# Patient Record
Sex: FEMALE | Race: WHITE | Employment: OTHER | URBAN - METROPOLITAN AREA
[De-identification: names, ages, dates, MRNs, and addresses within clinical notes are randomized per-mention and may not be internally consistent; named-entity substitution may affect disease eponyms.]

---

## 2017-01-06 ENCOUNTER — HOSPITAL ENCOUNTER (OUTPATIENT)
Dept: PHYSICAL THERAPY | Age: 67
Discharge: HOME | End: 2017-01-06
Payer: MEDICARE

## 2017-01-06 PROCEDURE — 97162 PT EVAL MOD COMPLEX 30 MIN: CPT

## 2017-01-06 PROCEDURE — G8979 MOBILITY GOAL STATUS: HCPCS

## 2017-01-06 PROCEDURE — G8978 MOBILITY CURRENT STATUS: HCPCS

## 2017-01-06 NOTE — PROGRESS NOTES
Higinio Alexander  : 1950 [facility]  [address]  [phone]   [phone]       OUTPATIENT PHYSICAL THERAPY:Initial Assessment 2017    ICD-10: Treatment Diagnosis: Difficulty in walking, not elsewhere classified (R26.2)  Pain in right hip (M25.551)   Precautions/Allergies:   Review of patient's allergies indicates no known allergies. Fall Risk Score: 0 (? 5 = High Risk)  MD Orders: Evaluate and treat; hip abductor/quad strengthening MEDICAL/REFERRING DIAGNOSIS:  right hip   DATE OF ONSET: 6-8 months ago  REFERRING PHYSICIAN: Maryann Gruber MD  RETURN PHYSICIAN APPOINTMENT: 2017     INITIAL ASSESSMENT:  Higinio Alexander is a 73 y.o. female who presents to physical therapy for R groin/hip pain beginning early to [facility]. This session, she presents with decreased R hip strength/endurance, pain in R hip region with end range R hip ER/flexion and with impingement tests, decreased dynamic standing balance, multiple postural and gait deficits, and decreased functional mobility as evident by a score of 33/80 on the Lower Extremity Functional Scale (with lower scores indicating increased disability). Her symptoms appear to be consistent with an impingement in her R hip (potentially due to labral tear and/or osteoarthritis, but will need further evaluation to rule in/out). Pt may benefit from skilled PT to address the above listed deficits to improve ability to perform pain-free standing/ambulation/ADLs and to improve overall quality of life prior to discharge. PROBLEM LIST (Impacting functional limitations):  1. Decreased Strength  2. Decreased ADL/Functional Activities  3. Decreased Transfer Abilities  4. Decreased Ambulation Ability/Technique  5. Decreased Balance  6. Increased Pain  7. Decreased Activity Tolerance  8. Decreased Flexibility/Joint Mobility INTERVENTIONS PLANNED:  1. Balance Exercise  2.  Bed Mobility  3. Cold  4. Family Education  5. Gait Training  6. Heat  7. Home Exercise Program (HEP)  8. Manual Therapy  9. Neuromuscular Re-education/Strengthening  10. Range of Motion (ROM)  11. Therapeutic Activites  12. Therapeutic Exercise/Strengthening  13. Transcutaneous Electrical Nerve Stimulation (TENS)  14. Transfer Training  15. Ultrasound (US)   TREATMENT PLAN:  Effective Dates: 1/6/2017 TO 3/3/2017. Frequency/Duration: 2 times a week for 8 weeks  GOALS: (Goals have been discussed and agreed upon with patient.)  Short-Term Functional Goals: Time Frame: 4 weeks  1. Pt will be compliant with HEP in order to increase LE strength/endurance/mobility to improve functional mobility and overall quality of life. 2. Pt will improve score on the Lower Extremity Functional Scale to 42/80 in order to demonstrate improved functional mobility and quality of life. 3. Pt will report standing for > 30 minutes with minimal to no increase in pain in order to be able to stand for prolonged periods as needed to perform household chores. 4. Pt will be able to ascend/descend 6 steps Flo with or without rail with reciprocal gait pattern in order to improve community mobility. Discharge Goals: Time Frame: 8 weeks  1. Pt will be independent with HEP in order to increase LE strength/endurance/mobility to improve functional mobility and overall quality of life. 2. Pt will improve score on the Lower Extremity Functional Scale to 50/80 in order to demonstrate improved functional mobility and quality of life. 3. Pt will report standing for > 60 minutes with minimal to no increase in pain in order to be able to stand for prolonged periods as needed to perform household chores. 4. Pt will be able to ascend/descend 14 steps Flo with or without rail with reciprocal gait pattern in order to improve community mobility.   Rehabilitation Potential For Stated Goals: Good  Regarding Teresa Murrell's therapy, I certify that the treatment plan above will be carried out by a therapist or under their direction. Thank you for this referral,  Erasmo Cagle DPT     Referring Physician Signature: Gali Torres MD              Date                    HISTORY:   History of Present Injury/Illness (Reason for Referral):  Pt states in 2013 she went to Dr. Thomas Hu for pain in R groin/hip adductor region (noticed after standing for majority of day). He told her it was most likely a labral tear per her report. States she did physical therapy for about 10 weeks, and then did exercises at home by herself. She was doing well with exercises and Mobic, but she had to be taken off Mobic 6-8 months ago to avoid kidney damage. After quitting use of Mobic, her R hip started aggravating her again (in same region). She tried taking Ultram, but it was only masking pain according to MD. She went back to [facility], and he put her on Relafin (anti-inflammatory). States she has been keeping up with her exercises (has been doing supine IR, supine stretching with legs straight, and butterfly stretch) but has not been doing them quite as often unless if her R hip started hurting. Pain at worst is 6-7/10 dull aching pain in her R anterior/medial R hip, and spreads down into her posterior leg to her ankle as a muscle pulling sensation, and into her R posterior iliac region (aggravating activities include prolonged standing/kneeling/ambulation > 30 minutes, ascending steps/hills, turning in bed at night to either side). Pain at best is 0/10 (eases pain with rest, pain medication, hot shower). Pt states she is not able to get up from the floor as well (has to have something to pull up on, otherwise her R hip will hurt and feel like it will not support her), and she is having difficulty going up steep hills [place] [facility]. Pain increases throughout the day.   Past Medical History/Comorbidities:   Ms. Rui Bella  has a past medical history of Allergic rhinitis; Chest pain; Fracture; GERD (gastroesophageal reflux disease); History of shingles; Hyperlipidemia; Hypertension; Hypothyroidism; IBS (irritable bowel syndrome); Internal hemorrhoids without mention of complication (0343); Kidney stone; Osteoarthritis of both hips; Palpitations; Rectocele (2014); and Urinary incontinence. Ms. Phong Arevalo  has a past surgical history that includes total abdominal hysterectomy (Left, 1988); oophorectomy (Right, 1993); open reduction internal fixation (Left, 2006); septoplasty; colonoscopy (4/14/14); ovarian cyst removal (1988); heent (1973); other surgical (Right, 1993); orthopaedic (Right, 2001); ankle fracture tx (Left, 2006); and bladder repair. 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 Indications: Restless Legs Syndrome, Disp: 270 Cap, Rfl: 3    traMADol (ULTRAM) 50 mg tablet, Take 1-2 tablets twice daily as needed for hip pain, Disp: 60 Tab, Rfl: 0    pravastatin (PRAVACHOL) 40 mg tablet, Take 1 Tab by mouth nightly., Disp: 90 Tab, Rfl: 1    buPROPion XL (WELLBUTRIN XL) 150 mg tablet, Take 1 Tab by mouth every morning., Disp: 30 Tab, Rfl: 11    naproxen sodium 220 mg cap, Take  by mouth as needed. , Disp: , Rfl:     levothyroxine (SYNTHROID) 100 mcg tablet, Take 1 Tab by mouth Daily (before breakfast). , Disp: 90 Tab, Rfl: 3    lisinopril (PRINIVIL, ZESTRIL) 20 mg tablet, Take 1 Tab by mouth daily. (Patient taking differently: Take 20 mg by mouth every morning.), Disp: 90 Tab, Rfl: 3    pantoprazole (PROTONIX) 40 mg tablet, Take 1 Tab by mouth daily. (Patient taking differently: Take 40 mg by mouth nightly.), Disp: 90 Tab, Rfl: 3    cholecalciferol, vitamin D3, (VITAMIN D3) 2,000 unit tab, Take 1 Tab by mouth every morning., Disp: , Rfl:     coenzyme q10 (CO Q-10) 10 mg cap, Take 1 Cap by mouth daily. (Patient taking differently: Take 1 Cap by mouth every morning.), Disp: 90 Cap, Rfl: 3    Cetirizine (ZYRTEC) 10 mg cap, Take 5 mg by mouth as needed. Indications: ALLERGIC RHINITIS, Disp: , Rfl:     senna-docusate (PERICOLACE) 8.6-50 mg per tablet, Take 1 Tab by mouth every other day.  Indications: CONSTIPATION, Disp: , Rfl:     FIBER, CALCIUM POLYCARBOPHIL, PO, Take  by mouth every morning., Disp: , Rfl:    Relafin (500 mg, 2 times per day)  Date Last Reviewed:  1/6/2017   # of Personal Factors/Comorbidities that affect the Plan of Care: 3+: HIGH COMPLEXITY   EXAMINATION:   Observation/Orthostatic Postural Assessment:          Slight L pelvic drop, slight forward trunk in standing; kyphotic posture in sitting with forward head/rounded shoulders and posterior pelvic tilt  Palpation:          Tenderness noted in R PSIS region and R gluteal muscles  ROM:    Initial measurement: (on 1/6/2017) Initial measurement: (on 1/6/2017) Reassessment measurement:  Reassessment measurement:    L LE:  L hip motion WFL but painful in R groin/hip area with end range hip ER R LE:  R hip motion WFL but painful in R anterior hip with end range hip ER, SLR, adduction       Strength:    Initial measurement: (on 1/6/2017) Initial measurement: (on 1/6/2017) Reassessment measurement:  Reassessment measurement:    L LE:  Hip flexion: 5/5  Hip extension: 4+/5  Hip abduction: 5/5  Hip adduction: 4+/5  Hip IR: 5/5  Hip ER: 4+/5  Knee flexion: 4/5  Knee extension: 5/5  Ankle DF: 4+/5  Ankle PF: 5/5 R LE:  Hip flexion: 4-/5*  Hip extension: 4/5  Hip abduction: 4/5*  Hip adduction: 4+/5  Hip IR: 4+/5  Hip ER: 4/5*  Knee flexion: 4-/5  Knee extension: 4+/5 (pain in posterior R hip)  Ankle DF: 4+/5  Ankle PF: 5/5  *pain around R hip        Special Tests:          Scours: + on R for impingement        R anterior hip impingement: + on R        Functional leg length discrepancy: unclear, need to assess further next session        Sacral PA mobilizations: + for pain at R side of sacrum        Juan Francisco test stretch: + for pulling pain at R anterior hip  Neurological Screen:        Myotomes:  WFL        Dermatomes:  No deficits noted  Functional Mobility:         Gait/Ambulation:  Shortened L/R step length, decreased B step clearance, increased lateral sway        Transfers:  Pt had increased difficulty standing up with increased time taken  Balance:         Minimal to moderate lateral sway during ambulation  Sensation:         No deficits noted   Body Structures Involved:  1. Nerves  2. Bones  3. Joints  4. Muscles  5. Ligaments Body Functions Affected:  1. Sensory/Pain  2. Neuromusculoskeletal  3. Movement Related Activities and Participation Affected:  1. General Tasks and Demands  2. Mobility  3. Domestic Life  4. Interpersonal Interactions and Relationships  5. Community, Social and [place]   # of elements that affect the Plan of Care: 4+: HIGH COMPLEXITY   CLINICAL PRESENTATION:   Presentation: Evolving clinical presentation with changing clinical characteristics: MODERATE COMPLEXITY   CLINICAL DECISION MAKING:   Outcome Measure: Tool Used: Lower Extremity Functional Scale (LEFS)  Score:  Initial: 33/80 Most Recent: X/80 (Date: -- )   Interpretation of Score: 20 questions each scored on a 5 point scale with 0 representing \"extreme difficulty or unable to perform\" and 4 representing \"no difficulty\".   The lower the score, the greater the functional disability. 80/80 represents no disability. Minimal detectable change is 9 points. Score 80 79-63 62-48 47-32 31-16 15-1 0   Modifier CH CI CJ CK CL CM CN     ? Mobility - Walking and Moving Around:     - CURRENT STATUS: CK - 40%-59% impaired, limited or restricted    - GOAL STATUS: CJ - 20%-39% impaired, limited or restricted    - D/C STATUS:  ---------------To be determined---------------    [place] / [place] / Product Type: Medicare /   Medical Necessity:   · Patient is expected to demonstrate progress in strength, range of motion, balance and functional technique to improve ability to perform pain-free standing/ambulation/kneeling and to improve overall quality of life. · Patient demonstrates good rehab potential due to higher previous functional level. Reason for Services/Other Comments:  · Patient continues to require modification of therapeutic interventions to increase complexity of exercises. Use of outcome tool(s) and clinical judgement create a POC that gives a: Questionable prediction of patient's progress: MODERATE COMPLEXITY   TREATMENT:   (In addition to Assessment/Re-Assessment sessions the following treatments were rendered)  Subjective comments at beginning of session: 2-3/10 pain   Assessment only today, no treatment provided. Treatment/Session Assessment:  See assessment above. · Pain/ Symptoms: Initial:   2-3/10 pain Post Session: 0/10 pain ·   Compliance with Program/Exercises: Will assess as treatment progresses. · Recommendations/Intent for next treatment session: \"Next visit will focus on advancements to more challenging activities and reduction in assistance provided\".  Work on R hip stretching, strengthening; assess for leg length discrepancy/pelvic instability  Total Treatment Duration:  PT Patient Time In/Time Out  Time In: 0937  Time Out: Lilly 27, BARBARA

## 2017-01-06 NOTE — PROGRESS NOTES
Ambulatory/Rehab Services H2 Model Falls Risk Assessment    Risk Factor Pts. ·   Confusion/Disorientation/Impulsivity  []    4 ·   Symptomatic Depression  []   2 ·   Altered Elimination  []   1 ·   Dizziness/Vertigo  []   1 ·   Gender (Male)  []   1 ·   Any administered antiepileptics (anticonvulsants):  []   2 ·   Any administered benzodiazepines:  []   1 ·   Visual Impairment (specify):  []   1 ·   Portable Oxygen Use  []   1 ·   Orthostatic ? BP  []   1 ·   History of Recent Falls (within 3 mos.)  []   5     Ability to Rise from Chair (choose one) Pts. ·   Ability to rise in a single movement  [x]   0 ·   Pushes up, successful in one attempt  []   1 ·   Multiple attempts, but successful  []   3 ·   Unable to rise without assistance  []   4   Total: (5 or greater = High Risk) 0     Falls Prevention Plan:   []                Physical Limitations to Exercise (specify):   []                Mobility Assistance Device (type):   []                Exercise/Equipment Adaptation (specify):    ©2010 [facility] [address] Patent #[id number].  Federal Law prohibits the replication, distribution or use without written permission from [facility] [employer]

## 2017-01-12 ENCOUNTER — HOSPITAL ENCOUNTER (OUTPATIENT)
Dept: PHYSICAL THERAPY | Age: 67
Discharge: HOME | End: 2017-01-12
Payer: MEDICARE

## 2017-01-12 PROCEDURE — 97110 THERAPEUTIC EXERCISES: CPT

## 2017-01-12 PROCEDURE — 97140 MANUAL THERAPY 1/> REGIONS: CPT

## 2017-01-12 NOTE — PROGRESS NOTES
Tita Carcamo  : [facility] [address]  [phone]   [phone]       OUTPATIENT PHYSICAL THERAPY:Daily Note 2017    ICD-10: Treatment Diagnosis: Difficulty in walking, not elsewhere classified (R26.2)  Pain in right hip (M25.551)   Precautions/Allergies:   Review of patient's allergies indicates no known allergies. Fall Risk Score: 0 (? 5 = High Risk)  MD Orders: Evaluate and treat; hip abductor/quad strengthening MEDICAL/REFERRING DIAGNOSIS:  right hip   DATE OF ONSET: 6-8 months ago  REFERRING PHYSICIAN: Deb Tenorio MD  RETURN PHYSICIAN APPOINTMENT: 2017     INITIAL ASSESSMENT:  Tita Carcamo is a 73 y.o. female who presents to physical therapy for R groin/hip pain [facility]. This session, she presents with decreased R hip strength/endurance, pain in R hip region with end range R hip ER/flexion and with impingement tests, decreased dynamic standing balance, multiple postural and gait deficits, and decreased functional mobility as evident by a score of 33/80 on the Lower Extremity Functional Scale (with lower scores indicating increased disability). Her symptoms appear to be consistent with an impingement in her R hip (potentially due to labral tear and/or osteoarthritis, but will need further evaluation to rule in/out). Pt may benefit from skilled PT to address the above listed deficits to improve ability to perform pain-free standing/ambulation/ADLs and to improve overall quality of life prior to discharge. PROBLEM LIST (Impacting functional limitations):  1. Decreased Strength  2. Decreased ADL/Functional Activities  3. Decreased Transfer Abilities  4. Decreased Ambulation Ability/Technique  5. Decreased Balance  6. Increased Pain  7. Decreased Activity Tolerance  8. Decreased Flexibility/Joint Mobility INTERVENTIONS PLANNED:  1. Balance Exercise  2.  Bed Mobility  3. Cold  4. Family Education  5. Gait Training  6. Heat  7. Home Exercise Program (HEP)  8. Manual Therapy  9. Neuromuscular Re-education/Strengthening  10. Range of Motion (ROM)  11. Therapeutic Activites  12. Therapeutic Exercise/Strengthening  13. Transcutaneous Electrical Nerve Stimulation (TENS)  14. Transfer Training  15. Ultrasound (US)   TREATMENT PLAN:  Effective Dates: 1/6/2017 TO 3/3/2017. Frequency/Duration: 2 times a week for 8 weeks  GOALS: (Goals have been discussed and agreed upon with patient.)  Short-Term Functional Goals: Time Frame: 4 weeks  1. Pt will be compliant with HEP in order to increase LE strength/endurance/mobility to improve functional mobility and overall quality of life. 2. Pt will improve score on the Lower Extremity Functional Scale to 42/80 in order to demonstrate improved functional mobility and quality of life. 3. Pt will report standing for > 30 minutes with minimal to no increase in pain in order to be able to stand for prolonged periods as needed to perform household chores. 4. Pt will be able to ascend/descend 6 steps Flo with or without rail with reciprocal gait pattern in order to improve community mobility. Discharge Goals: Time Frame: 8 weeks  1. Pt will be independent with HEP in order to increase LE strength/endurance/mobility to improve functional mobility and overall quality of life. 2. Pt will improve score on the Lower Extremity Functional Scale to 50/80 in order to demonstrate improved functional mobility and quality of life. 3. Pt will report standing for > 60 minutes with minimal to no increase in pain in order to be able to stand for prolonged periods as needed to perform household chores. 4. Pt will be able to ascend/descend 14 steps Flo with or without rail with reciprocal gait pattern in order to improve community mobility.   Rehabilitation Potential For Stated Goals: Good  Regarding Negar Murrell's therapy, I certify that the treatment plan above will be carried out by a therapist or under their direction. Thank you for this referral,  Emma Casper DPT     Referring Physician Signature: Karina Klein MD              Date                    HISTORY:   History of Present Injury/Illness (Reason for Referral):  Pt states in 2013 she went to Dr. Dell Hughes for pain in R groin/hip adductor region (noticed after standing for majority of day). He told her it was most likely a labral tear per her report. States she did physical therapy for about 10 weeks, and then did exercises at home by herself. She was doing well with exercises and Mobic, but she had to be taken off Mobic 6-8 months ago to avoid kidney damage. After quitting use of Mobic, her R hip started aggravating her again (in same region). She tried taking Ultram, but it was only masking pain according to MD. She went back to [facility], and he put her on Relafin (anti-inflammatory). States she has been keeping up with her exercises (has been doing supine IR, supine stretching with legs straight, and butterfly stretch) but has not been doing them quite as often unless if her R hip started hurting. Pain at worst is 6-7/10 dull aching pain in her R anterior/medial R hip, and spreads down into her posterior leg to her ankle as a muscle pulling sensation, and into her R posterior iliac region (aggravating activities include prolonged standing/kneeling/ambulation > 30 minutes, ascending steps/hills, turning in bed at night to either side). Pain at best is 0/10 (eases pain with rest, pain medication, hot shower). Pt states she is not able to get up from the floor as well (has to have something to pull up on, otherwise her R hip will hurt and feel like it will not support her), and she is having difficulty going up steep [place]. Pain increases throughout the day.   Past Medical History/Comorbidities:   Ms. Tawanda Cole  has a past medical history of Allergic rhinitis; Chest pain; Fracture; GERD (gastroesophageal reflux disease); History of shingles; Hyperlipidemia; Hypertension; Hypothyroidism; IBS (irritable bowel syndrome); Internal hemorrhoids without mention of complication (8853); Kidney stone; Osteoarthritis of both hips; Palpitations; Rectocele (2014); and Urinary incontinence. Ms. Lebron Tellez  has a past surgical history that includes total abdominal hysterectomy (Left, 1988); oophorectomy (Right, 1993); open reduction internal fixation (Left, 2006); septoplasty; colonoscopy (4/14/14); ovarian cyst removal (1988); heent (1973); other surgical (Right, 1993); orthopaedic (Right, 2001); ankle fracture tx (Left, 2006); and bladder repair. 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 Indications: Restless Legs Syndrome, Disp: 270 Cap, Rfl: 3    traMADol (ULTRAM) 50 mg tablet, Take 1-2 tablets twice daily as needed for hip pain, Disp: 60 Tab, Rfl: 0    pravastatin (PRAVACHOL) 40 mg tablet, Take 1 Tab by mouth nightly., Disp: 90 Tab, Rfl: 1    buPROPion XL (WELLBUTRIN XL) 150 mg tablet, Take 1 Tab by mouth every morning., Disp: 30 Tab, Rfl: 11    naproxen sodium 220 mg cap, Take  by mouth as needed. , Disp: , Rfl:     levothyroxine (SYNTHROID) 100 mcg tablet, Take 1 Tab by mouth Daily (before breakfast). , Disp: 90 Tab, Rfl: 3    lisinopril (PRINIVIL, ZESTRIL) 20 mg tablet, Take 1 Tab by mouth daily. (Patient taking differently: Take 20 mg by mouth every morning.), Disp: 90 Tab, Rfl: 3    pantoprazole (PROTONIX) 40 mg tablet, Take 1 Tab by mouth daily. (Patient taking differently: Take 40 mg by mouth nightly.), Disp: 90 Tab, Rfl: 3    cholecalciferol, vitamin D3, (VITAMIN D3) 2,000 unit tab, Take 1 Tab by mouth every morning., Disp: , Rfl:     coenzyme q10 (CO Q-10) 10 mg cap, Take 1 Cap by mouth daily. (Patient taking differently: Take 1 Cap by mouth every morning.), Disp: 90 Cap, Rfl: 3    Cetirizine (ZYRTEC) 10 mg cap, Take 5 mg by mouth as needed. Indications: ALLERGIC RHINITIS, Disp: , Rfl:     senna-docusate (PERICOLACE) 8.6-50 mg per tablet, Take 1 Tab by mouth every other day.  Indications: CONSTIPATION, Disp: , Rfl:     FIBER, CALCIUM POLYCARBOPHIL, PO, Take  by mouth every morning., Disp: , Rfl:    Relafin (500 mg, 2 times per day)  Date Last Reviewed:  1/12/2017   # of Personal Factors/Comorbidities that affect the Plan of Care: 3+: HIGH COMPLEXITY   EXAMINATION:   Observation/Orthostatic Postural Assessment:          Slight L pelvic drop, slight forward trunk in standing; kyphotic posture in sitting with forward head/rounded shoulders and posterior pelvic tilt  Palpation:          Tenderness noted in R PSIS region and R gluteal muscles  ROM:    Initial measurement: (on 1/6/2017) Initial measurement: (on 1/6/2017) Reassessment measurement:  Reassessment measurement:    L LE:  L hip motion WFL but painful in R groin/hip area with end range hip ER R LE:  R hip motion WFL but painful in R anterior hip with end range hip ER, SLR, adduction       Strength:    Initial measurement: (on 1/6/2017) Initial measurement: (on 1/6/2017) Reassessment measurement:  Reassessment measurement:    L LE:  Hip flexion: 5/5  Hip extension: 4+/5  Hip abduction: 5/5  Hip adduction: 4+/5  Hip IR: 5/5  Hip ER: 4+/5  Knee flexion: 4/5  Knee extension: 5/5  Ankle DF: 4+/5  Ankle PF: 5/5 R LE:  Hip flexion: 4-/5*  Hip extension: 4/5  Hip abduction: 4/5*  Hip adduction: 4+/5  Hip IR: 4+/5  Hip ER: 4/5*  Knee flexion: 4-/5  Knee extension: 4+/5 (pain in posterior R hip)  Ankle DF: 4+/5  Ankle PF: 5/5  *pain around R hip        Special Tests:          Scours: + on R for impingement        R anterior hip impingement: + on R        Functional leg length discrepancy: unclear, need to assess further next session        Sacral PA mobilizations: + for pain at R side of sacrum        Juan Francisco test stretch: + for pulling pain at R anterior hip  Neurological Screen:        Myotomes:  WFL        Dermatomes:  No deficits noted  Functional Mobility:         Gait/Ambulation:  Shortened L/R step length, decreased B step clearance, increased lateral sway        Transfers:  Pt had increased difficulty standing up with increased time taken  Balance:         Minimal to moderate lateral sway during ambulation  Sensation:         No deficits noted   Body Structures Involved:  1. Nerves  2. Bones  3. Joints  4. Muscles  5. Ligaments Body Functions Affected:  1. Sensory/Pain  2. Neuromusculoskeletal  3. Movement Related Activities and Participation Affected:  1. General Tasks and Demands  2. Mobility  3. Domestic Life  4. Interpersonal Interactions and Relationships  5. Community, Social and [place]   # of elements that affect the Plan of Care: 4+: HIGH COMPLEXITY   CLINICAL PRESENTATION:   Presentation: Evolving clinical presentation with changing clinical characteristics: MODERATE COMPLEXITY   CLINICAL DECISION MAKING:   Outcome Measure: Tool Used: Lower Extremity Functional Scale (LEFS)  Score:  Initial: 33/80 Most Recent: X/80 (Date: -- )   Interpretation of Score: 20 questions each scored on a 5 point scale with 0 representing \"extreme difficulty or unable to perform\" and 4 representing \"no difficulty\".   The lower the score, the greater the functional disability. 80/80 represents no disability. Minimal detectable change is 9 points. Score 80 79-63 62-48 47-32 31-16 15-1 0   Modifier CH CI CJ CK CL CM CN     ? Mobility - Walking and Moving Around:     - CURRENT STATUS: CK - 40%-59% impaired, limited or restricted    - GOAL STATUS: CJ - 20%-39% impaired, limited or restricted    - D/C STATUS:  ---------------To be determined---------------    [place] / [place] / Product Type: Medicare /   Medical Necessity:   · Patient is expected to demonstrate progress in strength, range of motion, balance and functional technique to improve ability to perform pain-free standing/ambulation/kneeling and to improve overall quality of life. · Patient demonstrates good rehab potential due to higher previous functional level. Reason for Services/Other Comments:  · Patient continues to require modification of therapeutic interventions to increase complexity of exercises. Use of outcome tool(s) and clinical judgement create a POC that gives a: Questionable prediction of patient's progress: MODERATE COMPLEXITY   TREATMENT:   (In addition to Assessment/Re-Assessment sessions the following treatments were rendered)  Subjective comments at beginning of session: 1-2/10 pain   THERAPEUTIC EXERCISE: (25 minutes):  Exercises per grid below to improve mobility, strength and dynamic movement of hip - right to improve functional mobility. Required minimal visual, verbal and manual cues to promote proper body alignment, promote proper body posture and promote proper body mechanics. Progressed resistance, range, repetitions and complexity of movement as indicated.     Date:  1/12/2017 Date:   Date:   Activity/Exercise Parameters Parameters    Nustep L3 resistance for 10 minutes with B LEs only for increased strength/endurance     Juan Francisco test stretch 30 second hold x 3 reps on R; pt reporting pull     Hip adductor (butterfly) stretch in modified LARRY position 30 second hold x 3 reps with instructions by therapist to relax at glutes     Bridging in hooklying position 20 reps/1 set with cues for increased hip extension AROM                       Manual therapy (15 minutes): With pt in hooklying position, R iliopsoas release performed multiple times to reduce muscular tightness and pain in R groin region. Treatment/Session Assessment:  Pt reporting increased tenderness in R iliopsoas region with pressure that eased after increased time performing iliopsoas release. · Pain/ Symptoms: Initial:   1-2/10 pain Post Session: no change in pain ·   Compliance with Program/Exercises: Will assess as treatment progresses. · Recommendations/Intent for next treatment session: \"Next visit will focus on advancements to more challenging activities and reduction in assistance provided\".  Work on R hip stretching, strengthening; assess for leg length discrepancy/pelvic instability  Total Treatment Duration:  PT Patient Time In/Time Out  Time In: 0845  Time Out: [facility]

## 2017-01-17 ENCOUNTER — HOSPITAL ENCOUNTER (OUTPATIENT)
Dept: PHYSICAL THERAPY | Age: 67
Discharge: HOME | End: 2017-01-17
Payer: MEDICARE

## 2017-01-17 PROCEDURE — 97110 THERAPEUTIC EXERCISES: CPT

## 2017-01-17 PROCEDURE — 97140 MANUAL THERAPY 1/> REGIONS: CPT

## 2017-01-17 NOTE — PROGRESS NOTES
Hernandez Hermosillo  : [facility] [address]  [phone]   [phone]       OUTPATIENT PHYSICAL THERAPY:Daily Note 2017    ICD-10: Treatment Diagnosis: Difficulty in walking, not elsewhere classified (R26.2)  Pain in right hip (M25.551)   Precautions/Allergies:   Review of patient's allergies indicates no known allergies. Fall Risk Score: 0 (? 5 = High Risk)  MD Orders: Evaluate and treat; hip abductor/quad strengthening MEDICAL/REFERRING DIAGNOSIS:  right hip   DATE OF ONSET: 6-8 months ago  REFERRING PHYSICIAN: Marissa Molina MD  RETURN PHYSICIAN APPOINTMENT: 2017     INITIAL ASSESSMENT:  Hernandez Hermosillo is a 73 y.o. female who presents to physical therapy for R groin/hip pain beginning early to [facility]. This session, she presents with decreased R hip strength/endurance, pain in R hip region with end range R hip ER/flexion and with impingement tests, decreased dynamic standing balance, multiple postural and gait deficits, and decreased functional mobility as evident by a score of 33/80 on the Lower Extremity Functional Scale (with lower scores indicating increased disability). Her symptoms appear to be consistent with an impingement in her R hip (potentially due to labral tear and/or osteoarthritis, but will need further evaluation to rule in/out). Pt may benefit from skilled PT to address the above listed deficits to improve ability to perform pain-free standing/ambulation/ADLs and to improve overall quality of life prior to discharge. PROBLEM LIST (Impacting functional limitations):  1. Decreased Strength  2. Decreased ADL/Functional Activities  3. Decreased Transfer Abilities  4. Decreased Ambulation Ability/Technique  5. Decreased Balance  6. Increased Pain  7. Decreased Activity Tolerance  8. Decreased Flexibility/Joint Mobility INTERVENTIONS PLANNED:  1. Balance Exercise  2.  Bed Mobility  3. Cold  4. Family Education  5. Gait Training  6. Heat  7. Home Exercise Program (HEP)  8. Manual Therapy  9. Neuromuscular Re-education/Strengthening  10. Range of Motion (ROM)  11. Therapeutic Activites  12. Therapeutic Exercise/Strengthening  13. Transcutaneous Electrical Nerve Stimulation (TENS)  14. Transfer Training  15. Ultrasound (US)   TREATMENT PLAN:  Effective Dates: 1/6/2017 TO 3/3/2017. Frequency/Duration: 2 times a week for 8 weeks  GOALS: (Goals have been discussed and agreed upon with patient.)  Short-Term Functional Goals: Time Frame: 4 weeks  1. Pt will be compliant with HEP in order to increase LE strength/endurance/mobility to improve functional mobility and overall quality of life. 2. Pt will improve score on the Lower Extremity Functional Scale to 42/80 in order to demonstrate improved functional mobility and quality of life. 3. Pt will report standing for > 30 minutes with minimal to no increase in pain in order to be able to stand for prolonged periods as needed to perform household chores. 4. Pt will be able to ascend/descend 6 steps Flo with or without rail with reciprocal gait pattern in order to improve community mobility. Discharge Goals: Time Frame: 8 weeks  1. Pt will be independent with HEP in order to increase LE strength/endurance/mobility to improve functional mobility and overall quality of life. 2. Pt will improve score on the Lower Extremity Functional Scale to 50/80 in order to demonstrate improved functional mobility and quality of life. 3. Pt will report standing for > 60 minutes with minimal to no increase in pain in order to be able to stand for prolonged periods as needed to perform household chores. 4. Pt will be able to ascend/descend 14 steps Flo with or without rail with reciprocal gait pattern in order to improve community mobility.   Rehabilitation Potential For Stated Goals: Good  Regarding Magdy Murrell's therapy, I certify that the treatment plan above will be carried out by a therapist or under their direction. Thank you for this referral,  Maury Yancey DPT     Referring Physician Signature: Xochitl Booker MD              Date                    HISTORY:   History of Present Injury/Illness (Reason for Referral):  Pt states in 2013 she went to Dr. Sabina Ramos for pain in R groin/hip adductor region (noticed after standing for majority of day). He told her it was most likely a labral tear per her report. States she did physical therapy for about 10 weeks, and then did exercises at home by herself. She was doing well with exercises and Mobic, but she had to be taken off Mobic 6-8 months ago to avoid kidney damage. After quitting use of Mobic, her R hip started aggravating her again (in same region). She tried taking Ultram, but it was only masking pain according to MD. She went back to [facility], and he put her on Relafin (anti-inflammatory). States she has been keeping up with her exercises (has been doing supine IR, supine stretching with legs straight, and butterfly stretch) but has not been doing them quite as often unless if her R hip started hurting. Pain at worst is 6-7/10 dull aching pain in her R anterior/medial R hip, and spreads down into her posterior leg to her ankle as a muscle pulling sensation, and into her R posterior iliac region (aggravating activities include prolonged standing/kneeling/ambulation > 30 minutes, ascending steps/hills, turning in bed at night to either side). Pain at best is 0/10 (eases pain with rest, pain medication, hot shower). Pt states she is not able to get up from the floor as well (has to have something to pull up on, otherwise her R hip will hurt and feel like it will not support her), and she is having difficulty going up steep hills for [place] [facility]. Pain increases throughout the day.   Past Medical History/Comorbidities:   Ms. Stephanie Parks  has a past medical history of Allergic rhinitis; Chest pain; Fracture; GERD (gastroesophageal reflux disease); History of shingles; Hyperlipidemia; Hypertension; Hypothyroidism; IBS (irritable bowel syndrome); Internal hemorrhoids without mention of complication (0097); Kidney stone; Osteoarthritis of both hips; Palpitations; Rectocele (2014); and Urinary incontinence. Ms. Carine Burgess  has a past surgical history that includes total abdominal hysterectomy (Left, 1988); oophorectomy (Right, 1993); open reduction internal fixation (Left, 2006); septoplasty; colonoscopy (4/14/14); ovarian cyst removal (1988); heent (1973); other surgical (Right, 1993); orthopaedic (Right, 2001); ankle fracture tx (Left, 2006); and bladder repair. Social History/Living Environment:    lives with male partner that is able to take help take care of her dogs if needed; 2 steps to get into [facility] (no rail); has 14 steps (on L going up) [place]; has 2 dogs that she walks twice a day over windy, sloped path  Prior Level of Function/Work/Activity:  Retired school nurse; has part-time job at 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 Indications: Restless Legs Syndrome, Disp: 270 Cap, Rfl: 3    traMADol (ULTRAM) 50 mg tablet, Take 1-2 tablets twice daily as needed for hip pain, Disp: 60 Tab, Rfl: 0    pravastatin (PRAVACHOL) 40 mg tablet, Take 1 Tab by mouth nightly., Disp: 90 Tab, Rfl: 1    buPROPion XL (WELLBUTRIN XL) 150 mg tablet, Take 1 Tab by mouth every morning., Disp: 30 Tab, Rfl: 11    naproxen sodium 220 mg cap, Take  by mouth as needed. , Disp: , Rfl:     levothyroxine (SYNTHROID) 100 mcg tablet, Take 1 Tab by mouth Daily (before breakfast). , Disp: 90 Tab, Rfl: 3    lisinopril (PRINIVIL, ZESTRIL) 20 mg tablet, Take 1 Tab by mouth daily. (Patient taking differently: Take 20 mg by mouth every morning.), Disp: 90 Tab, Rfl: 3    pantoprazole (PROTONIX) 40 mg tablet, Take 1 Tab by mouth daily. (Patient taking differently: Take 40 mg by mouth nightly.), Disp: 90 Tab, Rfl: 3    cholecalciferol, vitamin D3, (VITAMIN D3) 2,000 unit tab, Take 1 Tab by mouth every morning., Disp: , Rfl:     coenzyme q10 (CO Q-10) 10 mg cap, Take 1 Cap by mouth daily. (Patient taking differently: Take 1 Cap by mouth every morning.), Disp: 90 Cap, Rfl: 3    Cetirizine (ZYRTEC) 10 mg cap, Take 5 mg by mouth as needed. Indications: ALLERGIC RHINITIS, Disp: , Rfl:     senna-docusate (PERICOLACE) 8.6-50 mg per tablet, Take 1 Tab by mouth every other day.  Indications: CONSTIPATION, Disp: , Rfl:     FIBER, CALCIUM POLYCARBOPHIL, PO, Take  by mouth every morning., Disp: , Rfl:    Relafin (500 mg, 2 times per day)  Date Last Reviewed:  1/17/2017   # of Personal Factors/Comorbidities that affect the Plan of Care: 3+: HIGH COMPLEXITY   EXAMINATION:   Observation/Orthostatic Postural Assessment:          Slight L pelvic drop, slight forward trunk in standing; kyphotic posture in sitting with forward head/rounded shoulders and posterior pelvic tilt  Palpation:          Tenderness noted in R PSIS region and R gluteal muscles  ROM:    Initial measurement: (on 1/6/2017) Initial measurement: (on 1/6/2017) Reassessment measurement:  Reassessment measurement:    L LE:  L hip motion WFL but painful in R groin/hip area with end range hip ER R LE:  R hip motion WFL but painful in R anterior hip with end range hip ER, SLR, adduction       Strength:    Initial measurement: (on 1/6/2017) Initial measurement: (on 1/6/2017) Reassessment measurement:  Reassessment measurement:    L LE:  Hip flexion: 5/5  Hip extension: 4+/5  Hip abduction: 5/5  Hip adduction: 4+/5  Hip IR: 5/5  Hip ER: 4+/5  Knee flexion: 4/5  Knee extension: 5/5  Ankle DF: 4+/5  Ankle PF: 5/5 R LE:  Hip flexion: 4-/5*  Hip extension: 4/5  Hip abduction: 4/5*  Hip adduction: 4+/5  Hip IR: 4+/5  Hip ER: 4/5*  Knee flexion: 4-/5  Knee extension: 4+/5 (pain in posterior R hip)  Ankle DF: 4+/5  Ankle PF: 5/5  *pain around R hip        Special Tests:          Scours: + on R for impingement        R anterior hip impingement: + on R        Functional leg length discrepancy: unclear, need to assess further next session        Sacral PA mobilizations: + for pain at R side of sacrum        Juan Francsico test stretch: + for pulling pain at R anterior hip  Neurological Screen:        Myotomes:  WFL        Dermatomes:  No deficits noted  Functional Mobility:         Gait/Ambulation:  Shortened L/R step length, decreased B step clearance, increased lateral sway        Transfers:  Pt had increased difficulty standing up with increased time taken  Balance:         Minimal to moderate lateral sway during ambulation  Sensation:         No deficits noted   Body Structures Involved:  1. Nerves  2. Bones  3. Joints  4. Muscles  5. Ligaments Body Functions Affected:  1. Sensory/Pain  2. Neuromusculoskeletal  3. Movement Related Activities and Participation Affected:  1. General Tasks and Demands  2. Mobility  3. Domestic Life  4. Interpersonal Interactions and Relationships  5. Community, Social and [place]   # of elements that affect the Plan of Care: 4+: HIGH COMPLEXITY   CLINICAL PRESENTATION:   Presentation: Evolving clinical presentation with changing clinical characteristics: MODERATE COMPLEXITY   CLINICAL DECISION MAKING:   Outcome Measure: Tool Used: Lower Extremity Functional Scale (LEFS)  Score:  Initial: 33/80 Most Recent: X/80 (Date: -- )   Interpretation of Score: 20 questions each scored on a 5 point scale with 0 representing \"extreme difficulty or unable to perform\" and 4 representing \"no difficulty\".   The lower the score, the greater the functional disability. 80/80 represents no disability. Minimal detectable change is 9 points. Score 80 79-63 62-48 47-32 31-16 15-1 0   Modifier CH CI CJ CK CL CM CN     ? Mobility - Walking and Moving Around:     - CURRENT STATUS: CK - 40%-59% impaired, limited or restricted    - GOAL STATUS: CJ - 20%-39% impaired, limited or restricted    - D/C STATUS:  ---------------To be determined---------------    [place] / [place] / Product Type: Medicare /   Medical Necessity:   · Patient is expected to demonstrate progress in strength, range of motion, balance and functional technique to improve ability to perform pain-free standing/ambulation/kneeling and to improve overall quality of life. · Patient demonstrates good rehab potential due to higher previous functional level. Reason for Services/Other Comments:  · Patient continues to require modification of therapeutic interventions to increase complexity of exercises. Use of outcome tool(s) and clinical judgement create a POC that gives a: Questionable prediction of patient's progress: MODERATE COMPLEXITY   TREATMENT:   (In addition to Assessment/Re-Assessment sessions the following treatments were rendered)  Subjective comments at beginning of session: Pt states her R hip was more painful last night but is less painful  THERAPEUTIC EXERCISE: (30 minutes):  Exercises per grid below to improve mobility, strength and dynamic movement of hip - right to improve functional mobility. Required minimal visual, verbal and manual cues to promote proper body alignment, promote proper body posture and promote proper body mechanics. Progressed resistance, range, repetitions and complexity of movement as indicated.     Date:  1/12/2017 Date:  1/17/2017 Date:   Activity/Exercise Parameters Parameters    Nustep L3 resistance for 10 minutes with B LEs only for increased strength/endurance     Juan Francisco test stretch 30 second hold x 3 reps on R; pt reporting pull 30 second hold x 3 reps on R; pt reporting pull    Hip adductor (butterfly) stretch in modified LARRY position 30 second hold x 3 reps with instructions by therapist to relax at glutes 30 second hold x 3 reps     Bridging in hooklying position 20 reps/1 set with cues for increased hip extension AROM 20 reps/1 set with cues for increased hip extension AROM    Sidelying hip abduction  20 reps/1 set R LE with cues to avoid external rotation at hip    Sidelying clamshells  20 reps/1 set R LE with cues to avoid posterior pelvic rotation    Prone hip extension  10 reps/1 set R LE with cues to maintain R hip contact with mat to avoid lumbar rotation    Practicing body mechanics  For sweeping/mopping to avoid R hip pain    Piriformis stretch  Attempted x 2 but discontinued due to pt feeling discomfort in anterior R hip            Manual therapy (10 minutes): With pt in hooklying position, R iliopsoas release performed multiple times to reduce muscular tightness and pain in R groin region. Treatment/Session Assessment:  Pt demonstrating improved ability to perform exercises with minimal to no increase in pain this session. Stating slight soreness in R anterior hip region after iliopsoas release, but no increased pain noted. · Pain/ Symptoms: Initial:   1-2/10 pain Post Session: no change in pain ·   Compliance with Program/Exercises: Will assess as treatment progresses. · Recommendations/Intent for next treatment session: \"Next visit will focus on advancements to more challenging activities and reduction in assistance provided\".  Work on R hip stretching, strengthening; assess for leg length discrepancy/pelvic instability  Total Treatment Duration:  PT Patient Time In/Time Out  Time In: 0932  Time Out: [id number] [address], DPT

## 2017-01-19 ENCOUNTER — HOSPITAL ENCOUNTER (OUTPATIENT)
Dept: PHYSICAL THERAPY | Age: 67
Discharge: HOME | End: 2017-01-19
Payer: MEDICARE

## 2017-01-20 ENCOUNTER — TELEPHONE (OUTPATIENT)
Dept: NUTRITION | Age: 67
End: 2017-01-20

## 2017-01-20 ENCOUNTER — HOSPITAL ENCOUNTER (OUTPATIENT)
Dept: PHYSICAL THERAPY | Age: 67
Discharge: HOME | End: 2017-01-20
Payer: MEDICARE

## 2017-01-20 PROCEDURE — 97110 THERAPEUTIC EXERCISES: CPT

## 2017-01-20 PROCEDURE — 97140 MANUAL THERAPY 1/> REGIONS: CPT

## 2017-01-20 NOTE — PROGRESS NOTES
Selena Velez  : [facility] [address]  [phone]   [phone]       OUTPATIENT PHYSICAL THERAPY:Daily Note 2017    ICD-10: Treatment Diagnosis: Difficulty in walking, not elsewhere classified (R26.2)  Pain in right hip (M25.551)   Precautions/Allergies:   Review of patient's allergies indicates no known allergies. Fall Risk Score: 0 (? 5 = High Risk)  MD Orders: Evaluate and treat; hip abductor/quad strengthening MEDICAL/REFERRING DIAGNOSIS:  right hip   DATE OF ONSET: 6-8 months ago  REFERRING PHYSICIAN: Gali Torres MD  RETURN PHYSICIAN APPOINTMENT: 2017     INITIAL ASSESSMENT:  Selena Velez is a 73 y.o. female who presents to physical therapy for R groin/[facility] This session, she presents with decreased R hip strength/endurance, pain in R hip region with end range R hip ER/flexion and with impingement tests, decreased dynamic standing balance, multiple postural and gait deficits, and decreased functional mobility as evident by a score of 33/80 on the Lower Extremity Functional Scale (with lower scores indicating increased disability). Her symptoms appear to be consistent with an impingement in her R hip (potentially due to labral tear and/or osteoarthritis, but will need further evaluation to rule in/out). Pt may benefit from skilled PT to address the above listed deficits to improve ability to perform pain-free standing/ambulation/ADLs and to improve overall quality of life prior to discharge. PROBLEM LIST (Impacting functional limitations):  1. Decreased Strength  2. Decreased ADL/Functional Activities  3. Decreased Transfer Abilities  4. Decreased Ambulation Ability/Technique  5. Decreased Balance  6. Increased Pain  7. Decreased Activity Tolerance  8. Decreased Flexibility/Joint Mobility INTERVENTIONS PLANNED:  1. Balance Exercise  2.  Bed Mobility  3. Cold  4. Family Education  5. Gait Training  6. Heat  7. Home Exercise Program (HEP)  8. Manual Therapy  9. Neuromuscular Re-education/Strengthening  10. Range of Motion (ROM)  11. Therapeutic Activites  12. Therapeutic Exercise/Strengthening  13. Transcutaneous Electrical Nerve Stimulation (TENS)  14. Transfer Training  15. Ultrasound (US)   TREATMENT PLAN:  Effective Dates: 1/6/2017 TO 3/3/2017. Frequency/Duration: 2 times a week for 8 weeks  GOALS: (Goals have been discussed and agreed upon with patient.)  Short-Term Functional Goals: Time Frame: 4 weeks  1. Pt will be compliant with HEP in order to increase LE strength/endurance/mobility to improve functional mobility and overall quality of life. 2. Pt will improve score on the Lower Extremity Functional Scale to 42/80 in order to demonstrate improved functional mobility and quality of life. 3. Pt will report standing for > 30 minutes with minimal to no increase in pain in order to be able to stand for prolonged periods as needed to perform household chores. 4. Pt will be able to ascend/descend 6 steps Flo with or without rail with reciprocal gait pattern in order to improve community mobility. Discharge Goals: Time Frame: 8 weeks  1. Pt will be independent with HEP in order to increase LE strength/endurance/mobility to improve functional mobility and overall quality of life. 2. Pt will improve score on the Lower Extremity Functional Scale to 50/80 in order to demonstrate improved functional mobility and quality of life. 3. Pt will report standing for > 60 minutes with minimal to no increase in pain in order to be able to stand for prolonged periods as needed to perform household chores. 4. Pt will be able to ascend/descend 14 steps Flo with or without rail with reciprocal gait pattern in order to improve community mobility.   Rehabilitation Potential For Stated Goals: Good  Regarding Teresa Murrell's therapy, I certify that the treatment plan above will be carried out by a therapist or under their direction. Thank you for this referral,  Ray Fiore PTA     Referring Physician Signature: Raisa Cantu MD              Date                    HISTORY:   History of Present Injury/Illness (Reason for Referral):  Pt states in 2013 she went to Dr. Laureen Saavedra for pain in R groin/hip adductor region (noticed after standing for majority of day). He told her it was most likely a labral tear per her report. States she did physical therapy for about 10 weeks, and then did exercises at home by herself. She was doing well with exercises and Mobic, but she had to be taken off Mobic 6-8 months ago to avoid kidney damage. After quitting use of Mobic, her R hip started aggravating her again (in same region). She tried taking Ultram, but it was only masking pain according to MD. She went back to [facility], and he put her on Relafin (anti-inflammatory). States she has been keeping up with her exercises (has been doing supine IR, supine stretching with legs straight, and butterfly stretch) but has not been doing them quite as often unless if her R hip started hurting. Pain at worst is 6-7/10 dull aching pain in her R anterior/medial R hip, and spreads down into her posterior leg to her ankle as a muscle pulling sensation, and into her R posterior iliac region (aggravating activities include prolonged standing/kneeling/ambulation > 30 minutes, ascending steps/hills, turning in bed at night to either side). Pain at best is 0/10 (eases pain with rest, pain medication, hot shower). Pt states she is not able to get up from the floor as well (has to have something to pull up on, otherwise her R hip will hurt and feel like it will not support her), and she is having difficulty going up steep [place] [facility]. Pain increases throughout the day.   Past Medical History/Comorbidities:   Ms. Carmen Brown  has a past medical history of Allergic rhinitis; Chest pain; Fracture; GERD (gastroesophageal reflux disease); History of shingles; Hyperlipidemia; Hypertension; Hypothyroidism; IBS (irritable bowel syndrome); Internal hemorrhoids without mention of complication (4777); Kidney stone; Osteoarthritis of both hips; Palpitations; Rectocele (2014); and Urinary incontinence. Ms. Fausto Rosa  has a past surgical history that includes total abdominal hysterectomy (Left, 1988); oophorectomy (Right, 1993); open reduction internal fixation (Left, 2006); septoplasty; colonoscopy (4/14/14); ovarian cyst removal (1988); heent (1973); other surgical (Right, 1993); orthopaedic (Right, 2001); ankle fracture tx (Left, 2006); and bladder repair. 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 Indications: Restless Legs Syndrome, Disp: 270 Cap, Rfl: 3    traMADol (ULTRAM) 50 mg tablet, Take 1-2 tablets twice daily as needed for hip pain, Disp: 60 Tab, Rfl: 0    pravastatin (PRAVACHOL) 40 mg tablet, Take 1 Tab by mouth nightly., Disp: 90 Tab, Rfl: 1    buPROPion XL (WELLBUTRIN XL) 150 mg tablet, Take 1 Tab by mouth every morning., Disp: 30 Tab, Rfl: 11    naproxen sodium 220 mg cap, Take  by mouth as needed. , Disp: , Rfl:     levothyroxine (SYNTHROID) 100 mcg tablet, Take 1 Tab by mouth Daily (before breakfast). , Disp: 90 Tab, Rfl: 3    lisinopril (PRINIVIL, ZESTRIL) 20 mg tablet, Take 1 Tab by mouth daily. (Patient taking differently: Take 20 mg by mouth every morning.), Disp: 90 Tab, Rfl: 3    pantoprazole (PROTONIX) 40 mg tablet, Take 1 Tab by mouth daily. (Patient taking differently: Take 40 mg by mouth nightly.), Disp: 90 Tab, Rfl: 3    cholecalciferol, vitamin D3, (VITAMIN D3) 2,000 unit tab, Take 1 Tab by mouth every morning., Disp: , Rfl:     coenzyme q10 (CO Q-10) 10 mg cap, Take 1 Cap by mouth daily. (Patient taking differently: Take 1 Cap by mouth every morning.), Disp: 90 Cap, Rfl: 3    Cetirizine (ZYRTEC) 10 mg cap, Take 5 mg by mouth as needed. Indications: ALLERGIC RHINITIS, Disp: , Rfl:     senna-docusate (PERICOLACE) 8.6-50 mg per tablet, Take 1 Tab by mouth every other day.  Indications: CONSTIPATION, Disp: , Rfl:     FIBER, CALCIUM POLYCARBOPHIL, PO, Take  by mouth every morning., Disp: , Rfl:    Relafin (500 mg, 2 times per day)  Date Last Reviewed:  1/20/2017   # of Personal Factors/Comorbidities that affect the Plan of Care: 3+: HIGH COMPLEXITY   EXAMINATION:   Observation/Orthostatic Postural Assessment:          Slight L pelvic drop, slight forward trunk in standing; kyphotic posture in sitting with forward head/rounded shoulders and posterior pelvic tilt  Palpation:          Tenderness noted in R PSIS region and R gluteal muscles  ROM:    Initial measurement: (on 1/6/2017) Initial measurement: (on 1/6/2017) Reassessment measurement:  Reassessment measurement:    L LE:  L hip motion WFL but painful in R groin/hip area with end range hip ER R LE:  R hip motion WFL but painful in R anterior hip with end range hip ER, SLR, adduction       Strength:    Initial measurement: (on 1/6/2017) Initial measurement: (on 1/6/2017) Reassessment measurement:  Reassessment measurement:    L LE:  Hip flexion: 5/5  Hip extension: 4+/5  Hip abduction: 5/5  Hip adduction: 4+/5  Hip IR: 5/5  Hip ER: 4+/5  Knee flexion: 4/5  Knee extension: 5/5  Ankle DF: 4+/5  Ankle PF: 5/5 R LE:  Hip flexion: 4-/5*  Hip extension: 4/5  Hip abduction: 4/5*  Hip adduction: 4+/5  Hip IR: 4+/5  Hip ER: 4/5*  Knee flexion: 4-/5  Knee extension: 4+/5 (pain in posterior R hip)  Ankle DF: 4+/5  Ankle PF: 5/5  *pain around R hip        Special Tests:          Scours: + on R for impingement        R anterior hip impingement: + on R        Functional leg length discrepancy: unclear, need to assess further next session        Sacral PA mobilizations: + for pain at R side of sacrum        Juan Francisco test stretch: + for pulling pain at R anterior hip  Neurological Screen:        Myotomes:  WFL        Dermatomes:  No deficits noted  Functional Mobility:         Gait/Ambulation:  Shortened L/R step length, decreased B step clearance, increased lateral sway        Transfers:  Pt had increased difficulty standing up with increased time taken  Balance:         Minimal to moderate lateral sway during ambulation  Sensation:         No deficits noted   Body Structures Involved:  1. Nerves  2. Bones  3. Joints  4. Muscles  5. Ligaments Body Functions Affected:  1. Sensory/Pain  2. Neuromusculoskeletal  3. Movement Related Activities and Participation Affected:  1. General Tasks and Demands  2. Mobility  3. Domestic Life  4. Interpersonal Interactions and Relationships  5. Community, Social and [place]   # of elements that affect the Plan of Care: 4+: HIGH COMPLEXITY   CLINICAL PRESENTATION:   Presentation: Evolving clinical presentation with changing clinical characteristics: MODERATE COMPLEXITY   CLINICAL DECISION MAKING:   Outcome Measure: Tool Used: Lower Extremity Functional Scale (LEFS)  Score:  Initial: 33/80 Most Recent: X/80 (Date: -- )   Interpretation of Score: 20 questions each scored on a 5 point scale with 0 representing \"extreme difficulty or unable to perform\" and 4 representing \"no difficulty\".   The lower the score, the greater the functional disability. 80/80 represents no disability. Minimal detectable change is 9 points. Score 80 79-63 62-48 47-32 31-16 15-1 0   Modifier CH CI CJ CK CL CM CN     ? Mobility - Walking and Moving Around:     - CURRENT STATUS: CK - 40%-59% impaired, limited or restricted    - GOAL STATUS: CJ - 20%-39% impaired, limited or restricted    - D/C STATUS:  ---------------To be determined---------------    [place] / [place] / Product Type: Medicare /   Medical Necessity:   · Patient is expected to demonstrate progress in strength, range of motion, balance and functional technique to improve ability to perform pain-free standing/ambulation/kneeling and to improve overall quality of life. · Patient demonstrates good rehab potential due to higher previous functional level. Reason for Services/Other Comments:  · Patient continues to require modification of therapeutic interventions to increase complexity of exercises. Use of outcome tool(s) and clinical judgement create a POC that gives a: Questionable prediction of patient's progress: MODERATE COMPLEXITY   TREATMENT:   (In addition to Assessment/Re-Assessment sessions the following treatments were rendered)  Subjective comments at beginning of session: Pt states her R hip was more painful last night but is less painful  THERAPEUTIC EXERCISE: (20 minutes):  Exercises per grid below to improve mobility, strength and dynamic movement of hip - right to improve functional mobility. Required minimal visual, verbal and manual cues to promote proper body alignment, promote proper body posture and promote proper body mechanics. Progressed resistance, range, repetitions and complexity of movement as indicated.     Date:  1/12/2017 Date:  1/17/2017 Date:  1-20-17   Activity/Exercise Parameters Parameters    Nustep L3 resistance for 10 minutes with B LEs only for increased strength/endurance  L3  10 minutes with B LE's and B UE's for increased strength and endurance   Juan Francisco test stretch 30 second hold x 3 reps on R; pt reporting pull 30 second hold x 3 reps on R; pt reporting pull 3 x 30 sec hold R   Hip adductor (butterfly) stretch in modified LARRY position 30 second hold x 3 reps with instructions by therapist to relax at glutes 30 second hold x 3 reps  3 x 30 sec hold B - one at a time   Bridging in hooklying position 20 reps/1 set with cues for increased hip extension AROM 20 reps/1 set with cues for increased hip extension AROM 2 x 10 with cues for increased hip extension   Sidelying hip abduction  20 reps/1 set R LE with cues to avoid external rotation at hip    Sidelying clamshells  20 reps/1 set R LE with cues to avoid posterior pelvic rotation    Prone hip extension  10 reps/1 set R LE with cues to maintain R hip contact with mat to avoid lumbar rotation    Practicing body mechanics  For sweeping/mopping to avoid R hip pain    Piriformis stretch  Attempted x 2 but discontinued due to pt feeling discomfort in anterior R hip            Manual therapy (28 minutes): With pt in hooklying position, R iliopsoas release performed multiple times to reduce muscular tightness and pain in R groin region. Distraction with compression to R hip to decrease pain and increase mobility. Worked on stabilizing R hip and abdominal fascia while she did lower trunk rotation in opposite direction to increase hip mobility. Modalities (10 minutes)  Patient supine with wedge for cold pack to right groin area x 10 minutes to decrease pain and inflammation and increase mobility. Treatment/Session Assessment:  Pt demonstrating improved ability to perform exercises with minimal to no increase in pain this session. Stating slight soreness in R anterior hip region after iliopsoas release, but no increased pain noted. · Pain/ Symptoms: Initial:   3/10 pain Post Session: no pain (0/10) ·   Compliance with Program/Exercises:  Will assess as treatment progresses. · Recommendations/Intent for next treatment session: \"Next visit will focus on advancements to more challenging activities and reduction in assistance provided\".  Work on R hip stretching, strengthening; assess for leg length discrepancy/pelvic instability  Total Treatment Duration: 58 minutes   PT Patient Time In/Time Out  Time In: 1340  Time Out: Dede Gilliam 99, PTA

## 2017-01-20 NOTE — TELEPHONE ENCOUNTER
Nutrition Counseling: Called pt and left voicemail with contact information regarding Dr. Chela Valle referral for nutrition counseling.     Fatou Montez, [address] [place],   Outpatient Dietitian  Office: [phone]  Cell: [phone]

## 2017-01-24 ENCOUNTER — HOSPITAL ENCOUNTER (OUTPATIENT)
Dept: PHYSICAL THERAPY | Age: 67
Discharge: HOME | End: 2017-01-24
Payer: MEDICARE

## 2017-01-24 PROCEDURE — 97140 MANUAL THERAPY 1/> REGIONS: CPT

## 2017-01-24 PROCEDURE — 97110 THERAPEUTIC EXERCISES: CPT

## 2017-01-24 NOTE — PROGRESS NOTES
Jessica Starr  : 1950 [facility] [place] [address] [address] [facility]  [phone]   [phone]       OUTPATIENT PHYSICAL THERAPY:Daily Note 2017    ICD-10: Treatment Diagnosis: Difficulty in walking, not elsewhere classified (R26.2)  Pain in right hip (M25.551)   Precautions/Allergies:   Review of patient's allergies indicates no known allergies. Fall Risk Score: 0 (? 5 = High Risk)  MD Orders: Evaluate and treat; hip abductor/quad strengthening MEDICAL/REFERRING DIAGNOSIS:  right hip   DATE OF ONSET: 6-8 months ago  REFERRING PHYSICIAN: Anca Tejada MD  RETURN PHYSICIAN APPOINTMENT: 2017     INITIAL ASSESSMENT:  Jessica Starr is a 78 y.o. female who presents to physical therapy for R groin/hip pain beginning early to [address] [facility]. This session, she presents with decreased R hip strength/endurance, pain in R hip region with end range R hip ER/flexion and with impingement tests, decreased dynamic standing balance, multiple postural and gait deficits, and decreased functional mobility as evident by a score of 33/80 on the Lower Extremity Functional Scale (with lower scores indicating increased disability). Her symptoms appear to be consistent with an impingement in her R hip (potentially due to labral tear and/or osteoarthritis, but will need further evaluation to rule in/out). Pt may benefit from skilled PT to address the above listed deficits to improve ability to perform pain-free standing/ambulation/ADLs and to improve overall quality of life prior to discharge. PROBLEM LIST (Impacting functional limitations):  1. Decreased Strength  2. Decreased ADL/Functional Activities  3. Decreased Transfer Abilities  4. Decreased Ambulation Ability/Technique  5. Decreased Balance  6. Increased Pain  7. Decreased Activity Tolerance  8. Decreased Flexibility/Joint Mobility INTERVENTIONS PLANNED:  1. Balance Exercise  2.  Bed Mobility  3. Cold  4. Family Education  5. Gait Training  6. Heat  7. Home Exercise Program (HEP)  8. Manual Therapy  9. Neuromuscular Re-education/Strengthening  10. Range of Motion (ROM)  11. Therapeutic Activites  12. Therapeutic Exercise/Strengthening  13. Transcutaneous Electrical Nerve Stimulation (TENS)  14. Transfer Training  15. Ultrasound (US)   TREATMENT PLAN:  Effective Dates: 1/6/2017 TO 3/3/2017. Frequency/Duration: 2 times a week for 8 weeks  GOALS: (Goals have been discussed and agreed upon with patient.)  Short-Term Functional Goals: Time Frame: 4 weeks  1. Pt will be compliant with HEP in order to increase LE strength/endurance/mobility to improve functional mobility and overall quality of life. 2. Pt will improve score on the Lower Extremity Functional Scale to 42/80 in order to demonstrate improved functional mobility and quality of life. 3. Pt will report standing for > 30 minutes with minimal to no increase in pain in order to be able to stand for prolonged periods as needed to perform household chores. 4. Pt will be able to ascend/descend 6 steps Flo with or without rail with reciprocal gait pattern in order to improve community mobility. Discharge Goals: Time Frame: 8 weeks  1. Pt will be independent with HEP in order to increase LE strength/endurance/mobility to improve functional mobility and overall quality of life. 2. Pt will improve score on the Lower Extremity Functional Scale to 50/80 in order to demonstrate improved functional mobility and quality of life. 3. Pt will report standing for > 60 minutes with minimal to no increase in pain in order to be able to stand for prolonged periods as needed to perform household chores. 4. Pt will be able to ascend/descend 14 steps Flo with or without rail with reciprocal gait pattern in order to improve community mobility.   Rehabilitation Potential For Stated Goals: Good  Regarding Benita Murrell's therapy, I certify that the treatment plan above will be carried out by a therapist or under their direction. Thank you for this referral,  Nguyen Barragan DPT     Referring Physician Signature: Shabbir Concepcion MD              Date                    HISTORY:   History of Present Injury/Illness (Reason for Referral):  Pt states in 2013 she went to Dr. Annmarie Woo for pain in R groin/hip adductor region (noticed after standing for majority of day). He told her it was most likely a labral tear per her report. States she did physical therapy for about 10 weeks, and then did exercises at home by herself. She was doing well with exercises and Mobic, but she had to be taken off Mobic 6-8 months ago to avoid kidney damage. After quitting use of Mobic, her R hip started aggravating her again (in same region). She tried taking Ultram, but it was only masking pain according to MD. She went back to [facility], and he put her on Relafin (anti-inflammatory). States she has been keeping up with her exercises (has been doing supine IR, supine stretching with legs straight, and butterfly stretch) but has not been doing them quite as often unless if her R hip started hurting. Pain at worst is 6-7/10 dull aching pain in her R anterior/medial R hip, and spreads down into her posterior leg to her ankle as a muscle pulling sensation, and into her R posterior iliac region (aggravating activities include prolonged standing/kneeling/ambulation > 30 minutes, ascending steps/hills, turning in bed at night to either side). Pain at best is 0/10 (eases pain with rest, pain medication, hot shower). Pt states she is not able to get up from the floor as well (has to have something to pull up on, otherwise her R hip will hurt and feel like it will not support her), and she is having difficulty going up steep hills [facility]. Pain increases throughout the day.   Past Medical History/Comorbidities:   Ms. Jhon Degroot  has a past medical history of Allergic rhinitis; Chest pain; Fracture; GERD (gastroesophageal reflux disease); History of shingles; Hyperlipidemia; Hypertension; Hypothyroidism; IBS (irritable bowel syndrome); Internal hemorrhoids without mention of complication (7999); Kidney stone; Osteoarthritis of both hips; Palpitations; Rectocele (2014); and Urinary incontinence. Ms. Rui Bella  has a past surgical history that includes total abdominal hysterectomy (Left, 1988); oophorectomy (Right, 1993); open reduction internal fixation (Left, 2006); septoplasty; colonoscopy (4/14/14); ovarian cyst removal (1988); heent (1973); other surgical (Right, 1993); orthopaedic (Right, 2001); ankle fracture tx (Left, 2006); and bladder repair. 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 Indications: Restless Legs Syndrome, Disp: 270 Cap, Rfl: 3    traMADol (ULTRAM) 50 mg tablet, Take 1-2 tablets twice daily as needed for hip pain, Disp: 60 Tab, Rfl: 0    pravastatin (PRAVACHOL) 40 mg tablet, Take 1 Tab by mouth nightly., Disp: 90 Tab, Rfl: 1    buPROPion XL (WELLBUTRIN XL) 150 mg tablet, Take 1 Tab by mouth every morning., Disp: 30 Tab, Rfl: 11    naproxen sodium 220 mg cap, Take  by mouth as needed. , Disp: , Rfl:     levothyroxine (SYNTHROID) 100 mcg tablet, Take 1 Tab by mouth Daily (before breakfast). , Disp: 90 Tab, Rfl: 3    lisinopril (PRINIVIL, ZESTRIL) 20 mg tablet, Take 1 Tab by mouth daily. (Patient taking differently: Take 20 mg by mouth every morning.), Disp: 90 Tab, Rfl: 3    pantoprazole (PROTONIX) 40 mg tablet, Take 1 Tab by mouth daily. (Patient taking differently: Take 40 mg by mouth nightly.), Disp: 90 Tab, Rfl: 3    cholecalciferol, vitamin D3, (VITAMIN D3) 2,000 unit tab, Take 1 Tab by mouth every morning., Disp: , Rfl:     coenzyme q10 (CO Q-10) 10 mg cap, Take 1 Cap by mouth daily. (Patient taking differently: Take 1 Cap by mouth every morning.), Disp: 90 Cap, Rfl: 3    Cetirizine (ZYRTEC) 10 mg cap, Take 5 mg by mouth as needed. Indications: ALLERGIC RHINITIS, Disp: , Rfl:     senna-docusate (PERICOLACE) 8.6-50 mg per tablet, Take 1 Tab by mouth every other day.  Indications: CONSTIPATION, Disp: , Rfl:     FIBER, CALCIUM POLYCARBOPHIL, PO, Take  by mouth every morning., Disp: , Rfl:    Relafin (500 mg, 2 times per day)  Date Last Reviewed:  1/24/2017   # of Personal Factors/Comorbidities that affect the Plan of Care: 3+: HIGH COMPLEXITY   EXAMINATION:   Observation/Orthostatic Postural Assessment:          Slight L pelvic drop, slight forward trunk in standing; kyphotic posture in sitting with forward head/rounded shoulders and posterior pelvic tilt  Palpation:          Tenderness noted in R PSIS region and R gluteal muscles  ROM:    Initial measurement: (on 1/6/2017) Initial measurement: (on 1/6/2017) Reassessment measurement:  Reassessment measurement:    L LE:  L hip motion WFL but painful in R groin/hip area with end range hip ER R LE:  R hip motion WFL but painful in R anterior hip with end range hip ER, SLR, adduction       Strength:    Initial measurement: (on 1/6/2017) Initial measurement: (on 1/6/2017) Reassessment measurement:  Reassessment measurement:    L LE:  Hip flexion: 5/5  Hip extension: 4+/5  Hip abduction: 5/5  Hip adduction: 4+/5  Hip IR: 5/5  Hip ER: 4+/5  Knee flexion: 4/5  Knee extension: 5/5  Ankle DF: 4+/5  Ankle PF: 5/5 R LE:  Hip flexion: 4-/5*  Hip extension: 4/5  Hip abduction: 4/5*  Hip adduction: 4+/5  Hip IR: 4+/5  Hip ER: 4/5*  Knee flexion: 4-/5  Knee extension: 4+/5 (pain in posterior R hip)  Ankle DF: 4+/5  Ankle PF: 5/5  *pain around R hip        Special Tests:          Scours: + on R for impingement        R anterior hip impingement: + on R        Functional leg length discrepancy: unclear, need to assess further next session        Sacral PA mobilizations: + for pain at R side of sacrum        Juan Francisco test stretch: + for pulling pain at R anterior hip  Neurological Screen:        Myotomes:  WFL        Dermatomes:  No deficits noted  Functional Mobility:         Gait/Ambulation:  Shortened L/R step length, decreased B step clearance, increased lateral sway        Transfers:  Pt had increased difficulty standing up with increased time taken  Balance:         Minimal to moderate lateral sway during ambulation  Sensation:         No deficits noted   Body Structures Involved:  1. Nerves  2. Bones  3. Joints  4. Muscles  5. Ligaments Body Functions Affected:  1. Sensory/Pain  2. Neuromusculoskeletal  3. Movement Related Activities and Participation Affected:  1. General Tasks and Demands  2. Mobility  3. Domestic Life  4. Interpersonal Interactions and Relationships  5. Community, Social and [place]   # of elements that affect the Plan of Care: 4+: HIGH COMPLEXITY   CLINICAL PRESENTATION:   Presentation: Evolving clinical presentation with changing clinical characteristics: MODERATE COMPLEXITY   CLINICAL DECISION MAKING:   Outcome Measure: Tool Used: Lower Extremity Functional Scale (LEFS)  Score:  Initial: 33/80 Most Recent: X/80 (Date: -- )   Interpretation of Score: 20 questions each scored on a 5 point scale with 0 representing \"extreme difficulty or unable to perform\" and 4 representing \"no difficulty\".   The lower the score, the greater the functional disability. 80/80 represents no disability. Minimal detectable change is 9 points. Score 80 79-63 62-48 47-32 31-16 15-1 0   Modifier CH CI CJ CK CL CM CN     ? Mobility - Walking and Moving Around:     - CURRENT STATUS: CK - 40%-59% impaired, limited or restricted    - GOAL STATUS: CJ - 20%-39% impaired, limited or restricted    - D/C STATUS:  ---------------To be determined---------------    [place] / Plan[place] PART [place] / Product Type: Medicare /   Medical Necessity:   · Patient is expected to demonstrate progress in strength, range of motion, balance and functional technique to improve ability to perform pain-free standing/ambulation/kneeling and to improve overall quality of life. · Patient demonstrates good rehab potential due to higher previous functional level. Reason for Services/Other Comments:  · Patient continues to require modification of therapeutic interventions to increase complexity of exercises. Use of outcome tool(s) and clinical judgement create a POC that gives a: Questionable prediction of patient's progress: MODERATE COMPLEXITY   TREATMENT:   (In addition to Assessment/Re-Assessment sessions the following treatments were rendered)  Subjective comments at beginning of session: Pt states no pain today  THERAPEUTIC EXERCISE: (30 minutes):  Exercises per grid below to improve mobility, strength and dynamic movement of hip - right to improve functional mobility. Required minimal visual, verbal and manual cues to promote proper body alignment, promote proper body posture and promote proper body mechanics. Progressed resistance, range, repetitions and complexity of movement as indicated.     Date:  1/12/2017 Date:  1/17/2017 Date:  1-20-17 Date:  1/24/2017   Activity/Exercise Parameters Parameters     Nustep L3 resistance for 10 minutes with B LEs only for increased strength/endurance  L3  10 minutes with B LE's and B UE's for increased strength and endurance L1 on Physiostep with B LEs only for increased strength/endurance   Juan Francisco test stretch 30 second hold x 3 reps on R; pt reporting pull 30 second hold x 3 reps on R; pt reporting pull 3 x 30 sec hold R 30 second hold x 3 reps on R; pt reporting pull   Hip adductor (butterfly) stretch in modified LARRY position 30 second hold x 3 reps with instructions by therapist to relax at glutes 30 second hold x 3 reps  3 x 30 sec hold B - one at a time    Bridging in hooklying position 20 reps/1 set with cues for increased hip extension AROM 20 reps/1 set with cues for increased hip extension AROM 2 x 10 with cues for increased hip extension 20 reps/1 set with cues for increased hip extension AROM   Sidelying hip abduction  20 reps/1 set R LE with cues to avoid external rotation at hip  20 reps/1 set R LE with cues to avoid external rotation at hip   Sidelying clamshells  20 reps/1 set R LE with cues to avoid posterior pelvic rotation  20 reps/1 set R LE with cues to avoid posterior pelvic rotation   Prone hip extension  10 reps/1 set R LE with cues to maintain R hip contact with mat to avoid lumbar rotation  10 reps/2 sets R LE with cues to maintain R hip contact with mat to avoid lumbar rotation   Practicing body mechanics  For sweeping/mopping to avoid R hip pain     Piriformis stretch  Attempted x 2 but discontinued due to pt feeling discomfort in anterior R hip     Posterior pelvic tilts for core activation    15 reps/1 set with cues for correct movement   Core stabilization with leg press outs in hooklying    5 reps/1 set each LE; cues to maintain posterior pelvic tilt while doing push out; cue for correct technique                   Manual therapy (8 minutes): With pt in hooklying position, R iliopsoas release performed multiple times to reduce muscular tightness and pain in R groin region.      Modalities     Treatment/Session Assessment:  Pt reporting no pain throughout session (except tenderness in R iliopsoas with pressure), and was able to perform new exercises correctly. · Pain/ Symptoms: Initial:   0/10 pain Post Session: no pain (0/10) ·   Compliance with Program/Exercises: Will assess as treatment progresses. · Recommendations/Intent for next treatment session: \"Next visit will focus on advancements to more challenging activities and reduction in assistance provided\".  Work on R hip stretching, strengthening; assess for leg length discrepancy/pelvic instability  Total Treatment Duration:   PT Patient Time In/Time Out  Time In: 0930  Time Out: [address], DPT

## 2017-01-26 ENCOUNTER — HOSPITAL ENCOUNTER (OUTPATIENT)
Dept: PHYSICAL THERAPY | Age: 67
Discharge: HOME | End: 2017-01-26
Payer: MEDICARE

## 2017-01-26 NOTE — PROGRESS NOTES
[facility] [address]  [phone]   [phone]     OUTPATIENT DAILY NOTE     DATE: 1/26/2017    SUBJECTIVE:  Patient called and cancelled for appointment today. Will plan to follow up on next scheduled visit.     Charmayne Munro, PTA

## 2017-01-30 ENCOUNTER — TELEPHONE (OUTPATIENT)
Dept: NUTRITION | Age: 67
End: 2017-01-30

## 2017-01-31 ENCOUNTER — HOSPITAL ENCOUNTER (OUTPATIENT)
Dept: PHYSICAL THERAPY | Age: 67
Discharge: HOME | End: 2017-01-31
Payer: MEDICARE

## 2017-01-31 PROCEDURE — 97110 THERAPEUTIC EXERCISES: CPT

## 2017-01-31 NOTE — PROGRESS NOTES
Jc Douglas  : 1950 [facility] [address]  [phone]   [phone]       OUTPATIENT PHYSICAL THERAPY:Daily Note 2017    ICD-10: Treatment Diagnosis: Difficulty in walking, not elsewhere classified (R26.2)  Pain in right hip (M25.551)   Precautions/Allergies:   Review of patient's allergies indicates no known allergies. Fall Risk Score: 0 (? 5 = High Risk)  MD Orders: Evaluate and treat; hip abductor/quad strengthening MEDICAL/REFERRING DIAGNOSIS:  right hip   DATE OF ONSET: 6-8 months ago  REFERRING PHYSICIAN: Braydon Brooks MD  RETURN PHYSICIAN APPOINTMENT: 2017     INITIAL ASSESSMENT:  Jc Douglas is a 78 y.o. female who presents to physical therapy for R groin/hip pain beginning early to [facility]. This session, she presents with decreased R hip strength/endurance, pain in R hip region with end range R hip ER/flexion and with impingement tests, decreased dynamic standing balance, multiple postural and gait deficits, and decreased functional mobility as evident by a score of 33/80 on the Lower Extremity Functional Scale (with lower scores indicating increased disability). Her symptoms appear to be consistent with an impingement in her R hip (potentially due to labral tear and/or osteoarthritis, but will need further evaluation to rule in/out). Pt may benefit from skilled PT to address the above listed deficits to improve ability to perform pain-free standing/ambulation/ADLs and to improve overall quality of life prior to discharge. PROBLEM LIST (Impacting functional limitations):  1. Decreased Strength  2. Decreased ADL/Functional Activities  3. Decreased Transfer Abilities  4. Decreased Ambulation Ability/Technique  5. Decreased Balance  6. Increased Pain  7. Decreased Activity Tolerance  8. Decreased Flexibility/Joint Mobility INTERVENTIONS PLANNED:  1. Balance Exercise  2.  Bed Mobility  3. Cold  4. Family Education  5. Gait Training  6. Heat  7. Home Exercise Program (HEP)  8. Manual Therapy  9. Neuromuscular Re-education/Strengthening  10. Range of Motion (ROM)  11. Therapeutic Activites  12. Therapeutic Exercise/Strengthening  13. Transcutaneous Electrical Nerve Stimulation (TENS)  14. Transfer Training  15. Ultrasound (US)   TREATMENT PLAN:  Effective Dates: 1/6/2017 TO 3/3/2017. Frequency/Duration: 2 times a week for 8 weeks  GOALS: (Goals have been discussed and agreed upon with patient.)  Short-Term Functional Goals: Time Frame: 4 weeks  1. Pt will be compliant with HEP in order to increase LE strength/endurance/mobility to improve functional mobility and overall quality of life. 2. Pt will improve score on the Lower Extremity Functional Scale to 42/80 in order to demonstrate improved functional mobility and quality of life. 3. Pt will report standing for > 30 minutes with minimal to no increase in pain in order to be able to stand for prolonged periods as needed to perform household chores. 4. Pt will be able to ascend/descend 6 steps Flo with or without rail with reciprocal gait pattern in order to improve community mobility. Discharge Goals: Time Frame: 8 weeks  1. Pt will be independent with HEP in order to increase LE strength/endurance/mobility to improve functional mobility and overall quality of life. 2. Pt will improve score on the Lower Extremity Functional Scale to 50/80 in order to demonstrate improved functional mobility and quality of life. 3. Pt will report standing for > 60 minutes with minimal to no increase in pain in order to be able to stand for prolonged periods as needed to perform household chores. 4. Pt will be able to ascend/descend 14 steps Flo with or without rail with reciprocal gait pattern in order to improve community mobility.   Rehabilitation Potential For Stated Goals: Good  Regarding Kimberley Murrell's therapy, I certify that the treatment plan above will be carried out by a therapist or under their direction. Thank you for this referral,  Cornelius Reyes DPT     Referring Physician Signature: Josiah Hernandez MD              Date                    HISTORY:   History of Present Injury/Illness (Reason for Referral):  Pt states in 2013 she went to Dr. Sofy Irwin for pain in R groin/hip adductor region (noticed after standing for majority of day). He told her it was most likely a labral tear per her report. States she did physical therapy for about 10 weeks, and then did exercises at home by herself. She was doing well with exercises and Mobic, but she had to be taken off Mobic 6-8 months ago to avoid kidney damage. After quitting use of Mobic, her R hip started aggravating her again (in same region). She tried taking Ultram, but it was only masking pain according to MD. She went back to [facility], and he put her on Relafin (anti-inflammatory). States she has been keeping up with her exercises (has been doing supine IR, supine stretching with legs straight, and butterfly stretch) but has not been doing them quite as often unless if her R hip started hurting. Pain at worst is 6-7/10 dull aching pain in her R anterior/medial R hip, and spreads down into her posterior leg to her ankle as a muscle pulling sensation, and into her R posterior iliac region (aggravating activities include prolonged standing/kneeling/ambulation > 30 minutes, ascending steps/hills, turning in bed at night to either side). Pain at best is 0/10 (eases pain with rest, pain medication, hot shower). Pt states she is not able to get up from the floor as well (has to have something to pull up on, otherwise her R hip will hurt and feel like it will not support her), and she is having difficulty going up steep [place] [facility]. Pain increases throughout the day.   Past Medical History/Comorbidities:   Ms. Romano  has a past medical history of Allergic rhinitis; Chest pain; Fracture; GERD (gastroesophageal reflux disease); History of shingles; Hyperlipidemia; Hypertension; Hypothyroidism; IBS (irritable bowel syndrome); Internal hemorrhoids without mention of complication (4185); Kidney stone; Osteoarthritis of both hips; Palpitations; Rectocele (2014); and Urinary incontinence. Ms. Romano  has a past surgical history that includes total abdominal hysterectomy (Left, 1988); oophorectomy (Right, 1993); open reduction internal fixation (Left, 2006); septoplasty; colonoscopy (4/14/14); ovarian cyst removal (1988); heent (1973); other surgical (Right, 1993); orthopaedic (Right, 2001); ankle fracture tx (Left, 2006); and bladder repair. 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 Indications: Restless Legs Syndrome, Disp: 270 Cap, Rfl: 3    traMADol (ULTRAM) 50 mg tablet, Take 1-2 tablets twice daily as needed for hip pain, Disp: 60 Tab, Rfl: 0    pravastatin (PRAVACHOL) 40 mg tablet, Take 1 Tab by mouth nightly., Disp: 90 Tab, Rfl: 1    buPROPion XL (WELLBUTRIN XL) 150 mg tablet, Take 1 Tab by mouth every morning., Disp: 30 Tab, Rfl: 11    naproxen sodium 220 mg cap, Take  by mouth as needed. , Disp: , Rfl:     levothyroxine (SYNTHROID) 100 mcg tablet, Take 1 Tab by mouth Daily (before breakfast). , Disp: 90 Tab, Rfl: 3    lisinopril (PRINIVIL, ZESTRIL) 20 mg tablet, Take 1 Tab by mouth daily. (Patient taking differently: Take 20 mg by mouth every morning.), Disp: 90 Tab, Rfl: 3    pantoprazole (PROTONIX) 40 mg tablet, Take 1 Tab by mouth daily. (Patient taking differently: Take 40 mg by mouth nightly.), Disp: 90 Tab, Rfl: 3    cholecalciferol, vitamin D3, (VITAMIN D3) 2,000 unit tab, Take 1 Tab by mouth every morning., Disp: , Rfl:     coenzyme q10 (CO Q-10) 10 mg cap, Take 1 Cap by mouth daily. (Patient taking differently: Take 1 Cap by mouth every morning.), Disp: 90 Cap, Rfl: 3    Cetirizine (ZYRTEC) 10 mg cap, Take 5 mg by mouth as needed. Indications: ALLERGIC RHINITIS, Disp: , Rfl:     senna-docusate (PERICOLACE) 8.6-50 mg per tablet, Take 1 Tab by mouth every other day.  Indications: CONSTIPATION, Disp: , Rfl:     FIBER, CALCIUM POLYCARBOPHIL, PO, Take  by mouth every morning., Disp: , Rfl:    Relafin (500 mg, 2 times per day)  Date Last Reviewed:  1/31/2017   # of Personal Factors/Comorbidities that affect the Plan of Care: 3+: HIGH COMPLEXITY   EXAMINATION:   Observation/Orthostatic Postural Assessment:          Slight L pelvic drop, slight forward trunk in standing; kyphotic posture in sitting with forward head/rounded shoulders and posterior pelvic tilt  Palpation:          Tenderness noted in R PSIS region and R gluteal muscles  ROM:    Initial measurement: (on 1/6/2017) Initial measurement: (on 1/6/2017) Reassessment measurement:  Reassessment measurement:    L LE:  L hip motion WFL but painful in R groin/hip area with end range hip ER R LE:  R hip motion WFL but painful in R anterior hip with end range hip ER, SLR, adduction       Strength:    Initial measurement: (on 1/6/2017) Initial measurement: (on 1/6/2017) Reassessment measurement:  Reassessment measurement:    L LE:  Hip flexion: 5/5  Hip extension: 4+/5  Hip abduction: 5/5  Hip adduction: 4+/5  Hip IR: 5/5  Hip ER: 4+/5  Knee flexion: 4/5  Knee extension: 5/5  Ankle DF: 4+/5  Ankle PF: 5/5 R LE:  Hip flexion: 4-/5*  Hip extension: 4/5  Hip abduction: 4/5*  Hip adduction: 4+/5  Hip IR: 4+/5  Hip ER: 4/5*  Knee flexion: 4-/5  Knee extension: 4+/5 (pain in posterior R hip)  Ankle DF: 4+/5  Ankle PF: 5/5  *pain around R hip        Special Tests:          Scours: + on R for impingement        R anterior hip impingement: + on R        Functional leg length discrepancy: unclear, need to assess further next session        Sacral PA mobilizations: + for pain at R side of sacrum        Juan Francisco test stretch: + for pulling pain at R anterior hip  Neurological Screen:        Myotomes:  WFL        Dermatomes:  No deficits noted  Functional Mobility:         Gait/Ambulation:  Shortened L/R step length, decreased B step clearance, increased lateral sway        Transfers:  Pt had increased difficulty standing up with increased time taken  Balance:         Minimal to moderate lateral sway during ambulation  Sensation:         No deficits noted   Body Structures Involved:  1. Nerves  2. Bones  3. Joints  4. Muscles  5. Ligaments Body Functions Affected:  1. Sensory/Pain  2. Neuromusculoskeletal  3. Movement Related Activities and Participation Affected:  1. General Tasks and Demands  2. Mobility  3. Domestic Life  4. Interpersonal Interactions and Relationships  5. Community, Social and [place]   # of elements that affect the Plan of Care: 4+: HIGH COMPLEXITY   CLINICAL PRESENTATION:   Presentation: Evolving clinical presentation with changing clinical characteristics: MODERATE COMPLEXITY   CLINICAL DECISION MAKING:   Outcome Measure: Tool Used: Lower Extremity Functional Scale (LEFS)  Score:  Initial: 33/80 Most Recent: X/80 (Date: -- )   Interpretation of Score: 20 questions each scored on a 5 point scale with 0 representing \"extreme difficulty or unable to perform\" and 4 representing \"no difficulty\".   The lower the score, the greater the functional disability. 80/80 represents no disability. Minimal detectable change is 9 points. Score 80 79-63 62-48 47-32 31-16 15-1 0   Modifier CH CI CJ CK CL CM CN     ? Mobility - Walking and Moving Around:     - CURRENT STATUS: CK - 40%-59% impaired, limited or restricted    - GOAL STATUS: CJ - 20%-39% impaired, limited or restricted    - D/C STATUS:  ---------------To be determined---------------    [place] / [place] / Product Type: Medicare /   Medical Necessity:   · Patient is expected to demonstrate progress in strength, range of motion, balance and functional technique to improve ability to perform pain-free standing/ambulation/kneeling and to improve overall quality of life. · Patient demonstrates good rehab potential due to higher previous functional level. Reason for Services/Other Comments:  · Patient continues to require modification of therapeutic interventions to increase complexity of exercises. Use of outcome tool(s) and clinical judgement create a POC that gives a: Questionable prediction of patient's progress: MODERATE COMPLEXITY   TREATMENT:   (In addition to Assessment/Re-Assessment sessions the following treatments were rendered)  Subjective comments at beginning of session: Pt states she is feeling better; states she still has some pain after she has been doing a lot of standing activities  THERAPEUTIC EXERCISE: (40 minutes):  Exercises per grid below to improve mobility, strength and dynamic movement of hip - right to improve functional mobility. Required minimal visual, verbal and manual cues to promote proper body alignment, promote proper body posture and promote proper body mechanics. Progressed resistance, range, repetitions and complexity of movement as indicated.     Date:  1/12/2017 Date:  1/17/2017 Date:  1-20-17 Date:  1/24/2017 Date:  1/31/2017   Activity/Exercise Parameters Parameters      Nustep L3 resistance for 10 minutes with B LEs only for increased strength/endurance  L3  10 minutes with B LE's and B UE's for increased strength and endurance L1 on Physiostep with B LEs only for increased strength/endurance L4 on Nustep for 10 minutes   Juan Francisco test stretch 30 second hold x 3 reps on R; pt reporting pull 30 second hold x 3 reps on R; pt reporting pull 3 x 30 sec hold R 30 second hold x 3 reps on R; pt reporting pull 30 second hold x 3 reps on R; pt reporting pull   Hip adductor (butterfly) stretch in modified LARRY position 30 second hold x 3 reps with instructions by therapist to relax at glutes 30 second hold x 3 reps  3 x 30 sec hold B - one at a time     Bridging in hooklying position 20 reps/1 set with cues for increased hip extension AROM 20 reps/1 set with cues for increased hip extension AROM 2 x 10 with cues for increased hip extension 20 reps/1 set with cues for increased hip extension AROM Unilateral bridging on R LE; 10 reps/1 set   Sidelying hip abduction  20 reps/1 set R LE with cues to avoid external rotation at hip  20 reps/1 set R LE with cues to avoid external rotation at hip 20 reps/1 set R LE with cues to avoid external rotation at hip   Sidelying clamshells  20 reps/1 set R LE with cues to avoid posterior pelvic rotation  20 reps/1 set R LE with cues to avoid posterior pelvic rotation    Prone hip extension  10 reps/1 set R LE with cues to maintain R hip contact with mat to avoid lumbar rotation  10 reps/2 sets R LE with cues to maintain R hip contact with mat to avoid lumbar rotation 20 reps/1 set R LE with cues to maintain R hip contact with mat to avoid lumbar rotation   Practicing body mechanics  For sweeping/mopping to avoid R hip pain      Piriformis stretch  Attempted x 2 but discontinued due to pt feeling discomfort in anterior R hip      Posterior pelvic tilts for core activation    15 reps/1 set with cues for correct movement 20 reps/1 set with cues for correct movement   Core stabilization with leg press outs in hooklying    5 reps/1 set each LE; cues to maintain posterior pelvic tilt while doing push out; cue for correct technique 10 reps/1 set each LE; cues to maintain posterior pelvic tilt while doing push out; cue for correct technique   Standing hip flexion/abduction/extension        Standing with core activation     Cues for posterior pelvic tilt and core activation in standing;10 reps/1 set with cues for breathing and holding position while moving each LE forward slightly     Manual therapy   Modalities     Treatment/Session Assessment:  Pt reporting no pain at end off session, and is demonstrating improved ability to maintain core activation in supine and in standing (although required tactile and verbal cues to obtain core activation). · Pain/ Symptoms: Initial:   0/10 pain Post Session: no pain (0/10) ·   Compliance with Program/Exercises: Will assess as treatment progresses. · Recommendations/Intent for next treatment session: \"Next visit will focus on advancements to more challenging activities and reduction in assistance provided\".  Work on R hip stretching, strengthening; assess for leg length discrepancy/pelvic instability  Total Treatment Duration:   PT Patient Time In/Time Out  Time In: 0930  Time Out: [id number] [address], [facility]

## 2017-02-02 ENCOUNTER — HOSPITAL ENCOUNTER (OUTPATIENT)
Dept: PHYSICAL THERAPY | Age: 67
Discharge: HOME | End: 2017-02-02
Payer: MEDICARE

## 2017-02-02 PROCEDURE — 97110 THERAPEUTIC EXERCISES: CPT

## 2017-02-02 NOTE — PROGRESS NOTES
Jessica Aj  : 1950 [facility] [place] [address] [address] [facility]  [phone]   [phone]       OUTPATIENT PHYSICAL THERAPY:Daily Note 2017    ICD-10: Treatment Diagnosis: Difficulty in walking, not elsewhere classified (R26.2)  Pain in right hip (M25.551)   Precautions/Allergies:   Review of patient's allergies indicates no known allergies. Fall Risk Score: 0 (? 5 = High Risk)  MD Orders: Evaluate and treat; hip abductor/quad strengthening MEDICAL/REFERRING DIAGNOSIS:  right hip   DATE OF ONSET: 6-8 months ago  REFERRING PHYSICIAN: Diogenes Lara MD  RETURN PHYSICIAN APPOINTMENT: 2017     INITIAL ASSESSMENT:  Jessica Aj is a 78 y.o. female who presents to physical therapy for R groin/hip [facility] This session, she presents with decreased R hip strength/endurance, pain in R hip region with end range R hip ER/flexion and with impingement tests, decreased dynamic standing balance, multiple postural and gait deficits, and decreased functional mobility as evident by a score of 33/80 on the Lower Extremity Functional Scale (with lower scores indicating increased disability). Her symptoms appear to be consistent with an impingement in her R hip (potentially due to labral tear and/or osteoarthritis, but will need further evaluation to rule in/out). Pt may benefit from skilled PT to address the above listed deficits to improve ability to perform pain-free standing/ambulation/ADLs and to improve overall quality of life prior to discharge. PROBLEM LIST (Impacting functional limitations):  1. Decreased Strength  2. Decreased ADL/Functional Activities  3. Decreased Transfer Abilities  4. Decreased Ambulation Ability/Technique  5. Decreased Balance  6. Increased Pain  7. Decreased Activity Tolerance  8. Decreased Flexibility/Joint Mobility INTERVENTIONS PLANNED:  1. Balance Exercise  2.  Bed Mobility  3. Cold  4. Family Education  5. Gait Training  6. Heat  7. Home Exercise Program (HEP)  8. Manual Therapy  9. Neuromuscular Re-education/Strengthening  10. Range of Motion (ROM)  11. Therapeutic Activites  12. Therapeutic Exercise/Strengthening  13. Transcutaneous Electrical Nerve Stimulation (TENS)  14. Transfer Training  15. Ultrasound (US)   TREATMENT PLAN:  Effective Dates: 1/6/2017 TO 3/3/2017. Frequency/Duration: 2 times a week for 8 weeks  GOALS: (Goals have been discussed and agreed upon with patient.)  Short-Term Functional Goals: Time Frame: 4 weeks  1. Pt will be compliant with HEP in order to increase LE strength/endurance/mobility to improve functional mobility and overall quality of life. 2. Pt will improve score on the Lower Extremity Functional Scale to 42/80 in order to demonstrate improved functional mobility and quality of life. 3. Pt will report standing for > 30 minutes with minimal to no increase in pain in order to be able to stand for prolonged periods as needed to perform household chores. 4. Pt will be able to ascend/descend 6 steps Flo with or without rail with reciprocal gait pattern in order to improve community mobility. Discharge Goals: Time Frame: 8 weeks  1. Pt will be independent with HEP in order to increase LE strength/endurance/mobility to improve functional mobility and overall quality of life. 2. Pt will improve score on the Lower Extremity Functional Scale to 50/80 in order to demonstrate improved functional mobility and quality of life. 3. Pt will report standing for > 60 minutes with minimal to no increase in pain in order to be able to stand for prolonged periods as needed to perform household chores. 4. Pt will be able to ascend/descend 14 steps Flo with or without rail with reciprocal gait pattern in order to improve community mobility.   Rehabilitation Potential For Stated Goals: Good  Regarding Manuela Goltz Casey's therapy, I certify that the treatment plan above will be carried out by a therapist or under their direction. Thank you for this referral,  JAYCEE SmithT     Referring Physician Signature: Mari Ortega MD              Date                    HISTORY:   History of Present Injury/Illness (Reason for Referral):  Pt states in 2013 she went to Dr. Pernell Seay for pain in R groin/hip adductor region (noticed after standing for majority of day). He told her it was most likely a labral tear per her report. States she did physical therapy for about 10 weeks, and then did exercises at home by herself. She was doing well with exercises and Mobic, but she had to be taken off Mobic 6-8 months ago to avoid kidney damage. After quitting use of Mobic, her R hip started aggravating her again (in same region). She tried taking Ultram, but it was only masking pain according to MD. She went back to [facility], and he put her on Relafin (anti-inflammatory). States she has been keeping up with her exercises (has been doing supine IR, supine stretching with legs straight, and butterfly stretch) but has not been doing them quite as often unless if her R hip started hurting. Pain at worst is 6-7/10 dull aching pain in her R anterior/medial R hip, and spreads down into her posterior leg to her ankle as a muscle pulling sensation, and into her R posterior iliac region (aggravating activities include prolonged standing/kneeling/ambulation > 30 minutes, ascending steps/hills, turning in bed at night to either side). Pain at best is 0/10 (eases pain with rest, pain medication, hot shower). Pt states she is not able to get up from the floor as well (has to have something to pull up on, otherwise her R hip will hurt and feel like it will not support her), and she is having difficulty going [place] Pain increases throughout the day.   Past Medical History/Comorbidities:   Ms. Mumtaz Solares  has a past medical history of Allergic rhinitis; Chest pain; Fracture; GERD (gastroesophageal reflux disease); History of shingles; Hyperlipidemia; Hypertension; Hypothyroidism; IBS (irritable bowel syndrome); Internal hemorrhoids without mention of complication (1692); Kidney stone; Osteoarthritis of both hips; Palpitations; Rectocele (2014); and Urinary incontinence. Ms. Eric Borrero  has a past surgical history that includes total abdominal hysterectomy (Left, 1988); oophorectomy (Right, 1993); open reduction internal fixation (Left, 2006); septoplasty; colonoscopy (4/14/14); ovarian cyst removal (1988); heent (1973); other surgical (Right, 1993); orthopaedic (Right, 2001); ankle fracture tx (Left, 2006); and bladder repair. 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 Indications: Restless Legs Syndrome, Disp: 270 Cap, Rfl: 3    traMADol (ULTRAM) 50 mg tablet, Take 1-2 tablets twice daily as needed for hip pain, Disp: 60 Tab, Rfl: 0    pravastatin (PRAVACHOL) 40 mg tablet, Take 1 Tab by mouth nightly., Disp: 90 Tab, Rfl: 1    buPROPion XL (WELLBUTRIN XL) 150 mg tablet, Take 1 Tab by mouth every morning., Disp: 30 Tab, Rfl: 11    naproxen sodium 220 mg cap, Take  by mouth as needed. , Disp: , Rfl:     levothyroxine (SYNTHROID) 100 mcg tablet, Take 1 Tab by mouth Daily (before breakfast). , Disp: 90 Tab, Rfl: 3    lisinopril (PRINIVIL, ZESTRIL) 20 mg tablet, Take 1 Tab by mouth daily. (Patient taking differently: Take 20 mg by mouth every morning.), Disp: 90 Tab, Rfl: 3    pantoprazole (PROTONIX) 40 mg tablet, Take 1 Tab by mouth daily. (Patient taking differently: Take 40 mg by mouth nightly.), Disp: 90 Tab, Rfl: 3    cholecalciferol, vitamin D3, (VITAMIN D3) 2,000 unit tab, Take 1 Tab by mouth every morning., Disp: , Rfl:     coenzyme q10 (CO Q-10) 10 mg cap, Take 1 Cap by mouth daily. (Patient taking differently: Take 1 Cap by mouth every morning.), Disp: 90 Cap, Rfl: 3    Cetirizine (ZYRTEC) 10 mg cap, Take 5 mg by mouth as needed. Indications: ALLERGIC RHINITIS, Disp: , Rfl:     senna-docusate (PERICOLACE) 8.6-50 mg per tablet, Take 1 Tab by mouth every other day.  Indications: CONSTIPATION, Disp: , Rfl:     FIBER, CALCIUM POLYCARBOPHIL, PO, Take  by mouth every morning., Disp: , Rfl:    Relafin (500 mg, 2 times per day)  Date Last Reviewed:  2/2/2017   # of Personal Factors/Comorbidities that affect the Plan of Care: 3+: HIGH COMPLEXITY   EXAMINATION:   Observation/Orthostatic Postural Assessment:          Slight L pelvic drop, slight forward trunk in standing; kyphotic posture in sitting with forward head/rounded shoulders and posterior pelvic tilt  Palpation:          Tenderness noted in R PSIS region and R gluteal muscles  ROM:    Initial measurement: (on 1/6/2017) Initial measurement: (on 1/6/2017) Reassessment measurement:  Reassessment measurement:    L LE:  L hip motion WFL but painful in R groin/hip area with end range hip ER R LE:  R hip motion WFL but painful in R anterior hip with end range hip ER, SLR, adduction       Strength:    Initial measurement: (on 1/6/2017) Initial measurement: (on 1/6/2017) Reassessment measurement:  Reassessment measurement:    L LE:  Hip flexion: 5/5  Hip extension: 4+/5  Hip abduction: 5/5  Hip adduction: 4+/5  Hip IR: 5/5  Hip ER: 4+/5  Knee flexion: 4/5  Knee extension: 5/5  Ankle DF: 4+/5  Ankle PF: 5/5 R LE:  Hip flexion: 4-/5*  Hip extension: 4/5  Hip abduction: 4/5*  Hip adduction: 4+/5  Hip IR: 4+/5  Hip ER: 4/5*  Knee flexion: 4-/5  Knee extension: 4+/5 (pain in posterior R hip)  Ankle DF: 4+/5  Ankle PF: 5/5  *pain around R hip        Special Tests:          Scours: + on R for impingement        R anterior hip impingement: + on R        Functional leg length discrepancy: unclear, need to assess further next session        Sacral PA mobilizations: + for pain at R side of sacrum        Juan Francisco test stretch: + for pulling pain at R anterior hip  Neurological Screen:        Myotomes:  WFL        Dermatomes:  No deficits noted  Functional Mobility:         Gait/Ambulation:  Shortened L/R step length, decreased B step clearance, increased lateral sway        Transfers:  Pt had increased difficulty standing up with increased time taken  Balance:         Minimal to moderate lateral sway during ambulation  Sensation:         No deficits noted   Body Structures Involved:  1. Nerves  2. Bones  3. Joints  4. Muscles  5. Ligaments Body Functions Affected:  1. Sensory/Pain  2. Neuromusculoskeletal  3. Movement Related Activities and Participation Affected:  1. General Tasks and Demands  2. Mobility  3. Domestic Life  4. Interpersonal Interactions and Relationships  5. Community, Social and [place]   # of elements that affect the Plan of Care: 4+: HIGH COMPLEXITY   CLINICAL PRESENTATION:   Presentation: Evolving clinical presentation with changing clinical characteristics: MODERATE COMPLEXITY   CLINICAL DECISION MAKING:   Outcome Measure: Tool Used: Lower Extremity Functional Scale (LEFS)  Score:  Initial: 33/80 Most Recent: X/80 (Date: -- )   Interpretation of Score: 20 questions each scored on a 5 point scale with 0 representing \"extreme difficulty or unable to perform\" and 4 representing \"no difficulty\".   The lower the score, the greater the functional disability. 80/80 represents no disability. Minimal detectable change is 9 points. Score 80 79-63 62-48 47-32 31-16 15-1 0   Modifier CH CI CJ CK CL CM CN     ? Mobility - Walking and Moving Around:     - CURRENT STATUS: CK - 40%-59% impaired, limited or restricted    - GOAL STATUS: CJ - 20%-39% impaired, limited or restricted    - D/C STATUS:  ---------------To be determined---------------    [place] / [place] / Product Type: Medicare /   Medical Necessity:   · Patient is expected to demonstrate progress in strength, range of motion, balance and functional technique to improve ability to perform pain-free standing/ambulation/kneeling and to improve overall quality of life. · Patient demonstrates good rehab potential due to higher previous functional level. Reason for Services/Other Comments:  · Patient continues to require modification of therapeutic interventions to increase complexity of exercises. Use of outcome tool(s) and clinical judgement create a POC that gives a: Questionable prediction of patient's progress: MODERATE COMPLEXITY   TREATMENT:   (In addition to Assessment/Re-Assessment sessions the following treatments were rendered)  Subjective comments at beginning of session: Pt states she has been driving all day and has been having discomfort in the groin while driving  THERAPEUTIC EXERCISE: (45 minutes):  Exercises per grid below to improve mobility, strength and dynamic movement of hip - right to improve functional mobility. Required minimal visual, verbal and manual cues to promote proper body alignment, promote proper body posture and promote proper body mechanics. Progressed resistance, range, repetitions and complexity of movement as indicated.     Date:  1/17/2017 Date:  1-20-17 Date:  1/24/2017 Date:  1/31/2017 Date:  2/2/2017   Activity/Exercise Parameters       Nustep  L3  10 minutes with B LE's and B UE's for increased strength and endurance L1 on Physiostep with B LEs only for increased strength/endurance L4 on Nustep for 10 minutes L5 on Nustep for 10 minutes with B LEs only   Juan Francisco test stretch 30 second hold x 3 reps on R; pt reporting pull 3 x 30 sec hold R 30 second hold x 3 reps on R; pt reporting pull 30 second hold x 3 reps on R; pt reporting pull 30 second hold x 3 reps on R; pt reporting pull   Hip adductor (butterfly) stretch in modified LARRY position 30 second hold x 3 reps  3 x 30 sec hold B - one at a time      Bridging in hooklying position 20 reps/1 set with cues for increased hip extension AROM 2 x 10 with cues for increased hip extension 20 reps/1 set with cues for increased hip extension AROM Unilateral bridging on R LE; 10 reps/1 set Unilateral bridging on R LE; 15 reps/1 set   Sidelying hip abduction 20 reps/1 set R LE with cues to avoid external rotation at hip  20 reps/1 set R LE with cues to avoid external rotation at hip 20 reps/1 set R LE with cues to avoid external rotation at hip 20 reps/1 set R LE with cues to avoid external rotation at hip   Sidelying clamshells 20 reps/1 set R LE with cues to avoid posterior pelvic rotation  20 reps/1 set R LE with cues to avoid posterior pelvic rotation     Prone hip extension 10 reps/1 set R LE with cues to maintain R hip contact with mat to avoid lumbar rotation  10 reps/2 sets R LE with cues to maintain R hip contact with mat to avoid lumbar rotation 20 reps/1 set R LE with cues to maintain R hip contact with mat to avoid lumbar rotation 20 reps/1 set R LE with cues to maintain R hip contact with mat to avoid lumbar rotation   Practicing body mechanics For sweeping/mopping to avoid R hip pain       Piriformis stretch Attempted x 2 but discontinued due to pt feeling discomfort in anterior R hip    30 second hold x 3 reps on R   Posterior pelvic tilts for core activation   15 reps/1 set with cues for correct movement 20 reps/1 set with cues for correct movement 20 reps/1 set with cues for correct movement   Core stabilization with leg press outs in hooklying   5 reps/1 set each LE; cues to maintain posterior pelvic tilt while doing push out; cue for correct technique 10 reps/1 set each LE; cues to maintain posterior pelvic tilt while doing push out; cue for correct technique 10 reps/1 set each LE; cues to maintain posterior pelvic tilt while doing push out; cue for correct technique   Standing hip flexion/abduction/extension        Standing with core activation    Cues for posterior pelvic tilt and core activation in standing;10 reps/1 set with cues for breathing and holding position while moving each LE forward slightly Cues for posterior pelvic tilt and core activation in standing;10 reps/1 set with cues for breathing and holding position while moving each LE forward slightly     Manual therapy   Modalities     Treatment/Session Assessment:  Pt reporting no pain at end of session. She is continuing to demonstrate improved LE strength/endurance as evident by her ability to perform increased reps and improved technique with exercises. Discussed using cushion in car to decreased hip flexion to reduce pain. · Pain/ Symptoms: Initial:   0/10 pain Post Session: no pain (0/10) ·   Compliance with Program/Exercises: Will assess as treatment progresses. · Recommendations/Intent for next treatment session: \"Next visit will focus on advancements to more challenging activities and reduction in assistance provided\".  Work on R hip stretching, strengthening; assess for leg length discrepancy/pelvic instability  Total Treatment Duration:   PT Patient Time In/Time Out  Time In: 1430  Time Out: BARBARA Pendleton

## 2017-02-06 ENCOUNTER — HOSPITAL ENCOUNTER (OUTPATIENT)
Dept: PHYSICAL THERAPY | Age: 67
Discharge: HOME | End: 2017-02-06
Payer: MEDICARE

## 2017-02-06 PROCEDURE — 97110 THERAPEUTIC EXERCISES: CPT

## 2017-02-06 NOTE — PROGRESS NOTES
Rui Menard  : 1950 [facility] [place] [address] [facility]  [phone]   [phone]       OUTPATIENT PHYSICAL THERAPY:Daily Note 2017    ICD-10: Treatment Diagnosis: Difficulty in walking, not elsewhere classified (R26.2)  Pain in right hip (M25.551)   Precautions/Allergies:   Review of patient's allergies indicates no known allergies. Fall Risk Score: 0 (? 5 = High Risk)  MD Orders: Evaluate and treat; hip abductor/quad strengthening MEDICAL/REFERRING DIAGNOSIS:  right hip   DATE OF ONSET: 6-8 months ago  REFERRING PHYSICIAN: Mario Leach MD  RETURN PHYSICIAN APPOINTMENT: 2017     INITIAL ASSESSMENT:  Rui Menard is a 78 y.o. female who presents to physical therapy for R groin/hip pain beginning early to [facility]. This session, she presents with decreased R hip strength/endurance, pain in R hip region with end range R hip ER/flexion and with impingement tests, decreased dynamic standing balance, multiple postural and gait deficits, and decreased functional mobility as evident by a score of 33/80 on the Lower Extremity Functional Scale (with lower scores indicating increased disability). Her symptoms appear to be consistent with an impingement in her R hip (potentially due to labral tear and/or osteoarthritis, but will need further evaluation to rule in/out). Pt may benefit from skilled PT to address the above listed deficits to improve ability to perform pain-free standing/ambulation/ADLs and to improve overall quality of life prior to discharge. PROBLEM LIST (Impacting functional limitations):  1. Decreased Strength  2. Decreased ADL/Functional Activities  3. Decreased Transfer Abilities  4. Decreased Ambulation Ability/Technique  5. Decreased Balance  6. Increased Pain  7. Decreased Activity Tolerance  8. Decreased Flexibility/Joint Mobility INTERVENTIONS PLANNED:  1. Balance Exercise  2.  Bed Mobility  3. Cold  4. Family Education  5. Gait Training  6. Heat  7. Home Exercise Program (HEP)  8. Manual Therapy  9. Neuromuscular Re-education/Strengthening  10. Range of Motion (ROM)  11. Therapeutic Activites  12. Therapeutic Exercise/Strengthening  13. Transcutaneous Electrical Nerve Stimulation (TENS)  14. Transfer Training  15. Ultrasound (US)   TREATMENT PLAN:  Effective Dates: 1/6/2017 TO 3/3/2017. Frequency/Duration: 2 times a week for 8 weeks  GOALS: (Goals have been discussed and agreed upon with patient.)  Short-Term Functional Goals: Time Frame: 4 weeks  1. Pt will be compliant with HEP in order to increase LE strength/endurance/mobility to improve functional mobility and overall quality of life. 2. Pt will improve score on the Lower Extremity Functional Scale to 42/80 in order to demonstrate improved functional mobility and quality of life. 3. Pt will report standing for > 30 minutes with minimal to no increase in pain in order to be able to stand for prolonged periods as needed to perform household chores. 4. Pt will be able to ascend/descend 6 steps Flo with or without rail with reciprocal gait pattern in order to improve community mobility. Discharge Goals: Time Frame: 8 weeks  1. Pt will be independent with HEP in order to increase LE strength/endurance/mobility to improve functional mobility and overall quality of life. 2. Pt will improve score on the Lower Extremity Functional Scale to 50/80 in order to demonstrate improved functional mobility and quality of life. 3. Pt will report standing for > 60 minutes with minimal to no increase in pain in order to be able to stand for prolonged periods as needed to perform household chores. 4. Pt will be able to ascend/descend 14 steps Flo with or without rail with reciprocal gait pattern in order to improve community mobility.   Rehabilitation Potential For Stated Goals: Good  Regarding Nani Murrell's therapy, I certify that the treatment plan above will be carried out by a therapist or under their direction. Thank you for this referral,  Lolis Manning PTA     Referring Physician Signature: Dorie Malone MD              Date                    HISTORY:   History of Present Injury/Illness (Reason for Referral):  Pt states in 2013 she went to Dr. Segundo Elizabeth for pain in R groin/hip adductor region (noticed after standing for majority of day). He told her it was most likely a labral tear per her report. States she did physical therapy for about 10 weeks, and then did exercises at home by herself. She was doing well with exercises and Mobic, but she had to be taken off Mobic 6-8 months ago to avoid kidney damage. After quitting use of Mobic, her R hip started aggravating her again (in same region). She tried taking Ultram, but it was only masking pain according to MD. She went back to [facility], and he put her on Relafin (anti-inflammatory). States she has been keeping up with her exercises (has been doing supine IR, supine stretching with legs straight, and butterfly stretch) but has not been doing them quite as often unless if her R hip started hurting. Pain at worst is 6-7/10 dull aching pain in her R anterior/medial R hip, and spreads down into her posterior leg to her ankle as a muscle pulling sensation, and into her R posterior iliac region (aggravating activities include prolonged standing/kneeling/ambulation > 30 minutes, ascending steps/hills, turning in bed at night to either side). Pain at best is 0/10 (eases pain with rest, pain medication, hot shower). Pt states she is not able to get up from the floor as well (has to have something to pull up on, otherwise her R hip will hurt and feel like it will not support her), and she is having difficulty going up steep [place] [facility] [facility]. Pain increases throughout the day.   Past Medical History/Comorbidities:   Ms. Suzie Knowles  has a past medical history of Allergic rhinitis; Chest pain; Fracture; GERD (gastroesophageal reflux disease); History of shingles; Hyperlipidemia; Hypertension; Hypothyroidism; IBS (irritable bowel syndrome); Internal hemorrhoids without mention of complication (1151); Kidney stone; Osteoarthritis of both hips; Palpitations; Rectocele (2014); and Urinary incontinence. Ms. Carine Burgess  has a past surgical history that includes total abdominal hysterectomy (Left, 1988); oophorectomy (Right, 1993); open reduction internal fixation (Left, 2006); septoplasty; colonoscopy (4/14/14); ovarian cyst removal (1988); heent (1973); other surgical (Right, 1993); orthopaedic (Right, 2001); ankle fracture tx (Left, 2006); and bladder repair. 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 Indications: Restless Legs Syndrome, Disp: 270 Cap, Rfl: 3    traMADol (ULTRAM) 50 mg tablet, Take 1-2 tablets twice daily as needed for hip pain, Disp: 60 Tab, Rfl: 0    pravastatin (PRAVACHOL) 40 mg tablet, Take 1 Tab by mouth nightly., Disp: 90 Tab, Rfl: 1    buPROPion XL (WELLBUTRIN XL) 150 mg tablet, Take 1 Tab by mouth every morning., Disp: 30 Tab, Rfl: 11    naproxen sodium 220 mg cap, Take  by mouth as needed. , Disp: , Rfl:     levothyroxine (SYNTHROID) 100 mcg tablet, Take 1 Tab by mouth Daily (before breakfast). , Disp: 90 Tab, Rfl: 3    lisinopril (PRINIVIL, ZESTRIL) 20 mg tablet, Take 1 Tab by mouth daily. (Patient taking differently: Take 20 mg by mouth every morning.), Disp: 90 Tab, Rfl: 3    pantoprazole (PROTONIX) 40 mg tablet, Take 1 Tab by mouth daily. (Patient taking differently: Take 40 mg by mouth nightly.), Disp: 90 Tab, Rfl: 3    cholecalciferol, vitamin D3, (VITAMIN D3) 2,000 unit tab, Take 1 Tab by mouth every morning., Disp: , Rfl:     coenzyme q10 (CO Q-10) 10 mg cap, Take 1 Cap by mouth daily. (Patient taking differently: Take 1 Cap by mouth every morning.), Disp: 90 Cap, Rfl: 3    Cetirizine (ZYRTEC) 10 mg cap, Take 5 mg by mouth as needed. Indications: ALLERGIC RHINITIS, Disp: , Rfl:     senna-docusate (PERICOLACE) 8.6-50 mg per tablet, Take 1 Tab by mouth every other day.  Indications: CONSTIPATION, Disp: , Rfl:     FIBER, CALCIUM POLYCARBOPHIL, PO, Take  by mouth every morning., Disp: , Rfl:    Relafin (500 mg, 2 times per day)  Date Last Reviewed:  2/6/2017   # of Personal Factors/Comorbidities that affect the Plan of Care: 3+: HIGH COMPLEXITY   EXAMINATION:   Observation/Orthostatic Postural Assessment:          Slight L pelvic drop, slight forward trunk in standing; kyphotic posture in sitting with forward head/rounded shoulders and posterior pelvic tilt  Palpation:          Tenderness noted in R PSIS region and R gluteal muscles  ROM:    Initial measurement: (on 1/6/2017) Initial measurement: (on 1/6/2017) Reassessment measurement:  Reassessment measurement:    L LE:  L hip motion WFL but painful in R groin/hip area with end range hip ER R LE:  R hip motion WFL but painful in R anterior hip with end range hip ER, SLR, adduction       Strength:    Initial measurement: (on 1/6/2017) Initial measurement: (on 1/6/2017) Reassessment measurement:  Reassessment measurement:    L LE:  Hip flexion: 5/5  Hip extension: 4+/5  Hip abduction: 5/5  Hip adduction: 4+/5  Hip IR: 5/5  Hip ER: 4+/5  Knee flexion: 4/5  Knee extension: 5/5  Ankle DF: 4+/5  Ankle PF: 5/5 R LE:  Hip flexion: 4-/5*  Hip extension: 4/5  Hip abduction: 4/5*  Hip adduction: 4+/5  Hip IR: 4+/5  Hip ER: 4/5*  Knee flexion: 4-/5  Knee extension: 4+/5 (pain in posterior R hip)  Ankle DF: 4+/5  Ankle PF: 5/5  *pain around R hip        Special Tests:          Scours: + on R for impingement        R anterior hip impingement: + on R        Functional leg length discrepancy: unclear, need to assess further next session        Sacral PA mobilizations: + for pain at R side of sacrum        Juan Francisco test stretch: + for pulling pain at R anterior hip  Neurological Screen:        Myotomes:  WFL        Dermatomes:  No deficits noted  Functional Mobility:         Gait/Ambulation:  Shortened L/R step length, decreased B step clearance, increased lateral sway        Transfers:  Pt had increased difficulty standing up with increased time taken  Balance:         Minimal to moderate lateral sway during ambulation  Sensation:         No deficits noted   Body Structures Involved:  1. Nerves  2. Bones  3. Joints  4. Muscles  5. Ligaments Body Functions Affected:  1. Sensory/Pain  2. Neuromusculoskeletal  3. Movement Related Activities and Participation Affected:  1. General Tasks and Demands  2. Mobility  3. Domestic Life  4. Interpersonal Interactions and Relationships  5. Community, Social and [place]   # of elements that affect the Plan of Care: 4+: HIGH COMPLEXITY   CLINICAL PRESENTATION:   Presentation: Evolving clinical presentation with changing clinical characteristics: MODERATE COMPLEXITY   CLINICAL DECISION MAKING:   Outcome Measure: Tool Used: Lower Extremity Functional Scale (LEFS)  Score:  Initial: 33/80 Most Recent: X/80 (Date: -- )   Interpretation of Score: 20 questions each scored on a 5 point scale with 0 representing \"extreme difficulty or unable to perform\" and 4 representing \"no difficulty\".   The lower the score, the greater the functional disability. 80/80 represents no disability. Minimal detectable change is 9 points. Score 80 79-63 62-48 47-32 31-16 15-1 0   Modifier CH CI CJ CK CL CM CN     ? Mobility - Walking and Moving Around:     - CURRENT STATUS: CK - 40%-59% impaired, limited or restricted    - GOAL STATUS: CJ - 20%-39% impaired, limited or restricted    - D/C STATUS:  ---------------To be determined---------------    [place] / [place] / Product Type: Medicare /   Medical Necessity:   · Patient is expected to demonstrate progress in strength, range of motion, balance and functional technique to improve ability to perform pain-free standing/ambulation/kneeling and to improve overall quality of life. · Patient demonstrates good rehab potential due to higher previous functional level. Reason for Services/Other Comments:  · Patient continues to require modification of therapeutic interventions to increase complexity of exercises. Use of outcome tool(s) and clinical judgement create a POC that gives a: Questionable prediction of patient's progress: MODERATE COMPLEXITY   TREATMENT:   (In addition to Assessment/Re-Assessment sessions the following treatments were rendered)  Subjective comments at beginning of session: Pt states she has been driving all day and has been having discomfort in the groin while driving  THERAPEUTIC EXERCISE: (45 minutes):  Exercises per grid below to improve mobility, strength and dynamic movement of hip - right to improve functional mobility. Required minimal visual, verbal and manual cues to promote proper body alignment, promote proper body posture and promote proper body mechanics. Progressed resistance, range, repetitions and complexity of movement as indicated.     Date:  1-20-17 Date:  1/24/2017 Date:  1/31/2017 Date:  2/2/2017 2-6-17   Activity/Exercise        Nustep L3  10 minutes with B LE's and B UE's for increased strength and endurance L1 on Physiostep with B LEs only for increased strength/endurance L4 on Nustep for 10 minutes L5 on Nustep for 10 minutes with B LEs only Level 5  Nustep x 10 minutes with B LE's only     Juan Francisco test stretch 3 x 30 sec hold R 30 second hold x 3 reps on R; pt reporting pull 30 second hold x 3 reps on R; pt reporting pull 30 second hold x 3 reps on R; pt reporting pull 3 x 30 sec R   Hip adductor (butterfly) stretch in modified LARRY position 3 x 30 sec hold B - one at a time    Half butterfly  Just right   3 x 30 sec hold    Bridging in hooklying position 2 x 10 with cues for increased hip extension 20 reps/1 set with cues for increased hip extension AROM Unilateral bridging on R LE; 10 reps/1 set Unilateral bridging on R LE; 15 reps/1 set Double leg x 20     Single leg  2 x 5 B   Sidelying hip abduction  20 reps/1 set R LE with cues to avoid external rotation at hip 20 reps/1 set R LE with cues to avoid external rotation at hip 20 reps/1 set R LE with cues to avoid external rotation at hip    Sidelying clamshells  20 reps/1 set R LE with cues to avoid posterior pelvic rotation   X 20 R     Reverse clam  X 20 R   Prone hip extension  10 reps/2 sets R LE with cues to maintain R hip contact with mat to avoid lumbar rotation 20 reps/1 set R LE with cues to maintain R hip contact with mat to avoid lumbar rotation 20 reps/1 set R LE with cues to maintain R hip contact with mat to avoid lumbar rotation X 10  B with knee bent -   Using gluteals only    Practicing body mechanics        Piriformis stretch    30 second hold x 3 reps on R 3 x 30 sec R    Posterior pelvic tilts for core activation  15 reps/1 set with cues for correct movement 20 reps/1 set with cues for correct movement 20 reps/1 set with cues for correct movement    Core stabilization with leg press outs in hooklying  5 reps/1 set each LE; cues to maintain posterior pelvic tilt while doing push out; cue for correct technique 10 reps/1 set each LE; cues to maintain posterior pelvic tilt while doing push out; cue for correct technique 10 reps/1 set each LE; cues to maintain posterior pelvic tilt while doing push out; cue for correct technique X 10 B   Cues to not point foot     Standing hip flexion/abduction/extension        Standing with core activation   Cues for posterior pelvic tilt and core activation in standing;10 reps/1 set with cues for breathing and holding position while moving each LE forward slightly Cues for posterior pelvic tilt and core activation in standing;10 reps/1 set with cues for breathing and holding position while moving each LE forward slightly X 10 with cues for activation in standing and cues to breathe. Manual therapy   Modalities     Treatment/Session Assessment:  Pt reporting no pain at end of session. She is continuing to demonstrate improved LE strength/endurance as evident by her ability to perform increased reps and improved technique with exercises. Discussed using cushion in car to decreased hip flexion to reduce pain. · Pain/ Symptoms: Initial:   1-2/10 pain  4/10 last night, had to take an Ultram- achy night- didn't sleep   Seemagnus MCCRACKEN on Tues 2-14-17 Post Session: no pain (0/10) ·   Compliance with Program/Exercises: Will assess as treatment progresses. · Recommendations/Intent for next treatment session: \"Next visit will focus on advancements to more challenging activities and reduction in assistance provided\".  Work on R hip stretching, strengthening; assess for leg length discrepancy/pelvic instability  Total Treatment Duration: 45 minutes   PT Patient Time In/Time Out  Time In: 1100  Time Out: [id number] Fuentes Mcdonald PTA

## 2017-02-08 ENCOUNTER — HOSPITAL ENCOUNTER (OUTPATIENT)
Dept: PHYSICAL THERAPY | Age: 67
Discharge: HOME | End: 2017-02-08
Payer: MEDICARE

## 2017-02-08 PROCEDURE — G8978 MOBILITY CURRENT STATUS: HCPCS

## 2017-02-08 PROCEDURE — 97110 THERAPEUTIC EXERCISES: CPT

## 2017-02-08 PROCEDURE — G8979 MOBILITY GOAL STATUS: HCPCS

## 2017-02-08 NOTE — PROGRESS NOTES
Nicanor Zavala  : 1950 [facility] at [address]  [phone]   [phone]       OUTPATIENT PHYSICAL THERAPY:Recertification [id number]    ICD-10: Treatment Diagnosis: Difficulty in walking, not elsewhere classified (R26.2)  Pain in right hip (M25.551)   Precautions/Allergies:   Review of patient's allergies indicates no known allergies. Fall Risk Score: 0 (? 5 = High Risk)  MD Orders: Evaluate and treat; hip abductor/quad strengthening MEDICAL/REFERRING DIAGNOSIS:  right hip   DATE OF ONSET: 6-8 months ago  REFERRING PHYSICIAN: Katheryn Day MD  RETURN PHYSICIAN APPOINTMENT: 2017     INITIAL ASSESSMENT:  Nicanor Zavala has now attended 9 physical therapy sessions for R groin/hip pain beginning early to [address] [place] [address]. This session, she demonstrated improved R hip strength/endurance, improved dynamic standing balance, decreased postural and gait deficits, and improved functional mobility as evident by a score of 70/80 on the Lower Extremity Functional Scale (initial score of 33/80, with lower scores indicating increased disability). Despite the above listed improvements, she continues to demonstrate decreased R hip strength/endurance, multiple postural and gait deficits, and decreased functional mobility. Pt has significantly improved since initial eval, but may benefit from several additional sessions of skilled PT to address the above listed deficits to maximize ability to perform pain-free standing/ambulation/ADLs and to improve overall quality of life prior to discharge. PROBLEM LIST (Impacting functional limitations):  1. Decreased Strength  2. Decreased ADL/Functional Activities  3. Decreased Transfer Abilities  4. Decreased Ambulation Ability/Technique  5. Decreased Balance  6. Increased Pain  7. Decreased Activity Tolerance  8. Decreased Flexibility/Joint Mobility INTERVENTIONS PLANNED:  1. Balance Exercise  2.  Bed Mobility  3. Cold  4. Family Education  5. Gait Training  6. Heat  7. Home Exercise Program (HEP)  8. Manual Therapy  9. Neuromuscular Re-education/Strengthening  10. Range of Motion (ROM)  11. Therapeutic Activites  12. Therapeutic Exercise/Strengthening  13. Transcutaneous Electrical Nerve Stimulation (TENS)  14. Transfer Training  15. Ultrasound (US)   TREATMENT PLAN:  Effective Dates: 1/6/2017 TO 3/3/2017. Frequency/Duration: 1 times a week for 8 weeks  GOALS: (Goals have been discussed and agreed upon with patient.)  Short-Term Functional Goals: Time Frame: 4 weeks  1. Pt will be compliant with HEP in order to increase LE strength/endurance/mobility to improve functional mobility and overall quality of life. (MET 2/8/2017)  2. Pt will improve score on the Lower Extremity Functional Scale to 42/80 in order to demonstrate improved functional mobility and quality of life. (MET 2/8/2017)  3. Pt will report standing for > 30 minutes with minimal to no increase in pain in order to be able to stand for prolonged periods as needed to perform household chores. (MET 2/8/2017)  4. Pt will be able to ascend/descend 6 steps Flo with or without rail with reciprocal gait pattern in order to improve community mobility. (MET with rail 2/8/2017)  Discharge Goals: Time Frame: 8 weeks  1. Pt will be independent with HEP in order to increase LE strength/endurance/mobility to improve functional mobility and overall quality of life. (PROGRESSING 2/8/2017)  2. Pt will improve score on the Lower Extremity Functional Scale to 50/80 in order to demonstrate improved functional mobility and quality of life. (MET 2/8/2017)  3. Pt will report standing for > 60 minutes with minimal to no increase in pain in order to be able to stand for prolonged periods as needed to perform household chores. (PROGRESSING 2/8/2017)  4.  Pt will be able to ascend/descend 14 steps Flo with or without rail with reciprocal gait pattern in order to improve community mobility. (MET with rail 2/8/2017)  5. Pt will improve score on the Lower Extremity Functional Scale to 70/80 in order to demonstrate improved functional mobility and quality of life. (NEW GOAL 2/8/2017)  Rehabilitation Potential For Stated Goals: Good  Regarding Benita Murrell's therapy, I certify that the treatment plan above will be carried out by a therapist or under their direction. Thank you for this referral,  Denise Magana DPT     Referring Physician Signature: Mechelle Lang MD              Date                    HISTORY:   History of Present Injury/Illness (Reason for Referral):  Pt states in 2013 she went to Dr. Demetri Lyon for pain in R groin/hip adductor region (noticed after standing for majority of day). He told her it was most likely a labral tear per her report. States she did physical therapy for about 10 weeks, and then did exercises at home by herself. She was doing well with exercises and Mobic, but she had to be taken off Mobic 6-8 months ago to avoid kidney damage. After quitting use of Mobic, her R hip started aggravating her again (in same region). She tried taking Ultram, but it was only masking pain according to MD. She went back to [facility], and he put her on Relafin (anti-inflammatory). States she has been keeping up with her exercises (has been doing supine IR, supine stretching with legs straight, and butterfly stretch) but has not been doing them quite as often unless if her R hip started hurting. Pain at worst is 6-7/10 dull aching pain in her R anterior/medial R hip, and spreads down into her posterior leg to her ankle as a muscle pulling sensation, and into her R posterior iliac region (aggravating activities include prolonged standing/kneeling/ambulation > 30 minutes, ascending steps/hills, turning in bed at night to either side). Pain at best is 0/10 (eases pain with rest, pain medication, hot shower).  Pt states she is not able to get up from the floor as well (has to have something to pull up on, otherwise her R hip will hurt and feel like it will not support her), and she is having difficulty going up steep hills for walking around her townhome. Pain increases throughout the day. Past Medical History/Comorbidities:   Ms. Jeff Stringer  has a past medical history of Allergic rhinitis; Chest pain; Fracture; GERD (gastroesophageal reflux disease); History of shingles; Hyperlipidemia; Hypertension; Hypothyroidism; IBS (irritable bowel syndrome); Internal hemorrhoids without mention of complication (5402); Kidney stone; Osteoarthritis of both hips; Palpitations; Rectocele (2014); and Urinary incontinence. Ms. Jeff Stringer  has a past surgical history that includes total abdominal hysterectomy (Left, 1988); oophorectomy (Right, 1993); open reduction internal fixation (Left, 2006); septoplasty; colonoscopy (4/14/14); ovarian cyst removal (1988); heent (1973); other surgical (Right, 1993); orthopaedic (Right, 2001); ankle fracture tx (Left, 2006); and bladder repair. Social History/Living Environment:    lives with male partner that is able to take help take care of her dogs if needed; 2 steps to get into [facility] (no rail); has 14 [facility] (on L going up) 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  Indications: Restless Legs Syndrome, Disp: 270 Cap, Rfl: 3    traMADol (ULTRAM) 50 mg tablet, Take 1-2 tablets twice daily as needed for hip pain, Disp: 60 Tab, Rfl: 0    pravastatin (PRAVACHOL) 40 mg tablet, Take 1 Tab by mouth nightly., Disp: 90 Tab, Rfl: 1    buPROPion XL (WELLBUTRIN XL) 150 mg tablet, Take 1 Tab by mouth every morning., Disp: 30 Tab, Rfl: 11    naproxen sodium 220 mg cap, Take  by mouth as needed. , Disp: , Rfl:     levothyroxine (SYNTHROID) 100 mcg tablet, Take 1 Tab by mouth Daily (before breakfast). , Disp: 90 Tab, Rfl: 3    lisinopril (PRINIVIL, ZESTRIL) 20 mg tablet, Take 1 Tab by mouth daily. (Patient taking differently: Take 20 mg by mouth every morning.), Disp: 90 Tab, Rfl: 3    pantoprazole (PROTONIX) 40 mg tablet, Take 1 Tab by mouth daily. (Patient taking differently: Take 40 mg by mouth nightly.), Disp: 90 Tab, Rfl: 3    cholecalciferol, vitamin D3, (VITAMIN D3) 2,000 unit tab, Take 1 Tab by mouth every morning., Disp: , Rfl:     coenzyme q10 (CO Q-10) 10 mg cap, Take 1 Cap by mouth daily. (Patient taking differently: Take 1 Cap by mouth every morning.), Disp: 90 Cap, Rfl: 3    Cetirizine (ZYRTEC) 10 mg cap, Take 5 mg by mouth as needed. Indications: ALLERGIC RHINITIS, Disp: , Rfl:     senna-docusate (PERICOLACE) 8.6-50 mg per tablet, Take 1 Tab by mouth every other day.  Indications: CONSTIPATION, Disp: , Rfl:     FIBER, CALCIUM POLYCARBOPHIL, PO, Take  by mouth every morning., Disp: , Rfl:    Relafin (500 mg, 2 times per day)  Date Last Reviewed:  2/8/2017   # of Personal Factors/Comorbidities that affect the Plan of Care: 3+: HIGH COMPLEXITY   EXAMINATION:   Observation/Orthostatic Postural Assessment:          Slight L pelvic drop, slight forward trunk in standing; kyphotic posture in sitting with forward head/rounded shoulders and posterior pelvic tilt - improved upright posture on 2/8/2017  Palpation:          Tenderness noted in R PSIS region and R gluteal muscles  ROM:    Initial measurement: (on 1/6/2017) Initial measurement: (on 1/6/2017) Reassessment measurement:  Reassessment measurement:    L LE:  L hip motion WFL but painful in R groin/hip area with end range hip ER R LE:  R hip motion WFL but painful in R anterior hip with end range hip ER, SLR, adduction       Strength:    Initial measurement: (on 1/6/2017) Initial measurement: (on 1/6/2017) Reassessment measurement:  Reassessment measurement:    L LE:  Hip flexion: 5/5  Hip extension: 4+/5  Hip abduction: 5/5  Hip adduction: 4+/5  Hip IR: 5/5  Hip ER: 4+/5  Knee flexion: 4/5  Knee extension: 5/5  Ankle DF: 4+/5  Ankle PF: 5/5 R LE:  Hip flexion: 4-/5*  Hip extension: 4/5  Hip abduction: 4/5*  Hip adduction: 4+/5  Hip IR: 4+/5  Hip ER: 4/5*  Knee flexion: 4-/5  Knee extension: 4+/5 (pain in posterior R hip)  Ankle DF: 4+/5  Ankle PF: 5/5  *pain around R hip        Special Tests:          Scours: + on R for impingement        R anterior hip impingement: + on R        Functional leg length discrepancy: unclear, need to assess further next session        Sacral PA mobilizations: + for pain at R side of sacrum        Juan Francisco test stretch: no pain at R anterior hip on 2/8/2017  Neurological Screen:        Myotomes:  WFL        Dermatomes:  No deficits noted  Functional Mobility:         Gait/Ambulation:  Shortened L/R step length, decreased B step clearance, increased lateral sway        Transfers:  Pt had increased difficulty standing up with increased time taken  Balance:         Minimal to moderate lateral sway during ambulation  Sensation:         No deficits noted   Body Structures Involved:  1. Nerves  2. Bones  3. Joints  4. Muscles  5. Ligaments Body Functions Affected:  1. Sensory/Pain  2. Neuromusculoskeletal  3. Movement Related Activities and Participation Affected:  1. General Tasks and Demands  2. Mobility  3. Domestic Life  4. Interpersonal Interactions and Relationships  5.  Community, Social and [place]   # of elements that affect the Plan of Care: 4+: HIGH COMPLEXITY   CLINICAL PRESENTATION:   Presentation: Evolving clinical presentation with changing clinical characteristics: MODERATE COMPLEXITY CLINICAL DECISION MAKING:   Outcome Measure: Tool Used: Lower Extremity Functional Scale (LEFS)  Score:  Initial: 33/80 Most Recent: 65/80 (Date: 2/8/2017 )   Interpretation of Score: 20 questions each scored on a 5 point scale with 0 representing \"extreme difficulty or unable to perform\" and 4 representing \"no difficulty\". The lower the score, the greater the functional disability. 80/80 represents no disability. Minimal detectable change is 9 points. Score 80 79-63 62-48 47-32 31-16 15-1 0   Modifier CH CI CJ CK CL CM CN     ? Mobility - Walking and Moving Around:     - CURRENT STATUS: CI - 1%-19% impaired, limited or restricted    - GOAL STATUS: CI - 1%-19% impaired, limited or restricted    - D/C STATUS:  ---------------To be determined---------------    [place] / [place] / Product Type: Medicare /   Medical Necessity:   · Patient is expected to demonstrate progress in strength, range of motion, balance and functional technique to improve ability to perform pain-free standing/ambulation/kneeling and to improve overall quality of life. · Patient demonstrates good rehab potential due to higher previous functional level. Reason for Services/Other Comments:  · Patient continues to require modification of therapeutic interventions to increase complexity of exercises. Use of outcome tool(s) and clinical judgement create a POC that gives a: Questionable prediction of patient's progress: MODERATE COMPLEXITY   TREATMENT:   (In addition to Assessment/Re-Assessment sessions the following treatments were rendered)  Subjective comments at beginning of session: Pt states she has no pain today, but had pain over the weekend in her R hip when trying to sleep  THERAPEUTIC EXERCISE: (40 minutes):  Exercises per grid below to improve mobility, strength and dynamic movement of hip - right to improve functional mobility.   Required minimal visual, verbal and manual cues to promote proper body alignment, promote proper body posture and promote proper body mechanics. Progressed resistance, range, repetitions and complexity of movement as indicated.     Date:  1/24/2017 Date:  1/31/2017 Date:  2/2/2017 2-6-17 Date:  2/8/2017   Activity/Exercise        Nustep L1 on Physiostep with B LEs only for increased strength/endurance L4 on Nustep for 10 minutes L5 on Nustep for 10 minutes with B LEs only Level 5  Nustep x 10 minutes with B LE's only   Level 5  Nustep x 10 minutes with B LE's only     Juan Francisco test stretch 30 second hold x 3 reps on R; pt reporting pull 30 second hold x 3 reps on R; pt reporting pull 30 second hold x 3 reps on R; pt reporting pull 3 x 30 sec R 30 second hold x 3 reps on R; pt reporting pull   Hip adductor (butterfly) stretch in modified LARRY position    Half butterfly  Just right   3 x 30 sec hold  Half butterfly  Just right   3 x 30 sec hold    Bridging in hooklying position 20 reps/1 set with cues for increased hip extension AROM Unilateral bridging on R LE; 10 reps/1 set Unilateral bridging on R LE; 15 reps/1 set Double leg x 20     Single leg  2 x 5 B Double leg x 20     Single leg  2 x 5 B   Sidelying hip abduction 20 reps/1 set R LE with cues to avoid external rotation at hip 20 reps/1 set R LE with cues to avoid external rotation at hip 20 reps/1 set R LE with cues to avoid external rotation at hip     Sidelying clamshells 20 reps/1 set R LE with cues to avoid posterior pelvic rotation   X 20 R     Reverse clam  X 20 R X 20 R     Reverse clam  X 20 R   Prone hip extension 10 reps/2 sets R LE with cues to maintain R hip contact with mat to avoid lumbar rotation 20 reps/1 set R LE with cues to maintain R hip contact with mat to avoid lumbar rotation 20 reps/1 set R LE with cues to maintain R hip contact with mat to avoid lumbar rotation X 10  B with knee bent -   Using gluteals only  X 10  B with knee bent -   Using gluteals only    Practicing body mechanics Piriformis stretch   30 second hold x 3 reps on R 3 x 30 sec R     Posterior pelvic tilts for core activation 15 reps/1 set with cues for correct movement 20 reps/1 set with cues for correct movement 20 reps/1 set with cues for correct movement     Core stabilization with leg press outs in hooklying 5 reps/1 set each LE; cues to maintain posterior pelvic tilt while doing push out; cue for correct technique 10 reps/1 set each LE; cues to maintain posterior pelvic tilt while doing push out; cue for correct technique 10 reps/1 set each LE; cues to maintain posterior pelvic tilt while doing push out; cue for correct technique X 10 B   Cues to not point foot      Standing hip flexion/abduction/extension        Standing with core activation  Cues for posterior pelvic tilt and core activation in standing;10 reps/1 set with cues for breathing and holding position while moving each LE forward slightly Cues for posterior pelvic tilt and core activation in standing;10 reps/1 set with cues for breathing and holding position while moving each LE forward slightly X 10 with cues for activation in standing and cues to breathe. Step ups/downs     Ascending/descending 16 6-inch steps with reciprocal gait, using unilateral rail                     Manual therapy   Modalities     Treatment/Session Assessment:  See assessment above. · Pain/ Symptoms: Initial:   0/10 Post Session: no pain (0/10) ·   Compliance with Program/Exercises: Good compliance with attending scheduled sessions and performing HEP. · Recommendations/Intent for next treatment session: \"Next visit will focus on advancements to more challenging activities and reduction in assistance provided\".  Work on R hip stretching, strengthening; assess for leg length discrepancy/pelvic instability  Total Treatment Duration:   PT Patient Time In/Time Out  Time In: 1100  Time Out: [facility] , DPT

## 2017-02-13 ENCOUNTER — DOCUMENTATION ONLY (OUTPATIENT)
Dept: NUTRITION | Age: 67
End: 2017-02-13

## 2017-02-13 NOTE — PROGRESS NOTES
Nutrition Counseling:  Pt has not returned calls. Will close referral for this office. Please notify RD if it is desired for pt to be contacted again.     Paul Cade, [address], LD  Outpatient Dietitian  Office: [phone]  Cell: [phone]

## 2017-02-15 ENCOUNTER — HOSPITAL ENCOUNTER (OUTPATIENT)
Dept: PHYSICAL THERAPY | Age: 67
Discharge: HOME | End: 2017-02-15
Payer: MEDICARE

## 2017-02-15 NOTE — PROGRESS NOTES
[facility] at [address]  [phone]   [phone]     OUTPATIENT DAILY NOTE    NAME: Erika Steen    DATE: 2/15/2017    Patient canceled physical therapy appointment today due to MD wanting to hold therapy until pt gets an MRI. Will plan to follow up on next scheduled visit.     Hilbert Goldberg, DPT

## 2017-02-22 ENCOUNTER — APPOINTMENT (OUTPATIENT)
Dept: PHYSICAL THERAPY | Age: 67
End: 2017-02-22
Payer: MEDICARE

## 2017-02-23 ENCOUNTER — HOSPITAL ENCOUNTER (OUTPATIENT)
Dept: MRI IMAGING | Age: 67
Discharge: HOME | End: 2017-02-23
Attending: ORTHOPAEDIC SURGERY
Payer: MEDICARE

## 2017-02-23 DIAGNOSIS — M79.651 PAIN IN RIGHT THIGH: ICD-10-CM

## 2017-02-23 PROCEDURE — 72148 MRI LUMBAR SPINE W/O DYE: CPT

## 2017-03-01 PROBLEM — E03.2 HYPOTHYROIDISM DUE TO MEDICATION: Status: ACTIVE | Noted: 2017-03-01

## 2017-03-17 NOTE — PROGRESS NOTES
fernando  Azra Ortega  : 1950 [facility]  [address] [facility] [address], [place], [address] [facility] [place]  [phone]   [phone]       OUTPATIENT PHYSICAL THERAPY:Discontinuation Summary 3/17/2017    ICD-10: Treatment Diagnosis: Difficulty in walking, not elsewhere classified (R26.2)  Pain in right hip (M25.551)   Precautions/Allergies:   Review of patient's allergies indicates no known allergies. Fall Risk Score: 0 (? 5 = High Risk)  MD Orders: Evaluate and treat; hip abductor/quad strengthening MEDICAL/REFERRING DIAGNOSIS:  right hip   DATE OF ONSET: 6-8 months ago  REFERRING PHYSICIAN: Mechelle Lang MD  RETURN PHYSICIAN APPOINTMENT: 2017     INITIAL ASSESSMENT:  Azra Ortega has now attended 9 physical therapy sessions for R groin/hip pain beginning early to [address] [facility]. During this time, pt met 4 out of 4 of her short term goals and 2 out of 5 of her discharge goals. Will discharge pt from physical therapy at this time secondary to reaching end of POC. PROBLEM LIST (Impacting functional limitations):  1. Decreased Strength  2. Decreased ADL/Functional Activities  3. Decreased Transfer Abilities  4. Decreased Ambulation Ability/Technique  5. Decreased Balance  6. Increased Pain  7. Decreased Activity Tolerance  8. Decreased Flexibility/Joint Mobility INTERVENTIONS PLANNED:  1. Balance Exercise  2. Bed Mobility  3. Cold  4. Family Education  5. Gait Training  6. Heat  7. Home Exercise Program (HEP)  8. Manual Therapy  9. Neuromuscular Re-education/Strengthening  10. Range of Motion (ROM)  11. Therapeutic Activites  12. Therapeutic Exercise/Strengthening  13. Transcutaneous Electrical Nerve Stimulation (TENS)  14. Transfer Training  15. Ultrasound (US)   GOALS: (Goals have been discussed and agreed upon with patient.)  Short-Term Functional Goals: Time Frame: 4 weeks  1.  Pt will be compliant with HEP in order to increase LE strength/endurance/mobility to improve functional mobility and overall quality of life. (MET 2/8/2017)  2. Pt will improve score on the Lower Extremity Functional Scale to 42/80 in order to demonstrate improved functional mobility and quality of life. (MET 2/8/2017)  3. Pt will report standing for > 30 minutes with minimal to no increase in pain in order to be able to stand for prolonged periods as needed to perform household chores. (MET 2/8/2017)  4. Pt will be able to ascend/descend 6 steps Flo with or without rail with reciprocal gait pattern in order to improve community mobility. (MET with rail 2/8/2017)  Discharge Goals: Time Frame: 8 weeks  1. Pt will be independent with HEP in order to increase LE strength/endurance/mobility to improve functional mobility and overall quality of life. (PROGRESSING 2/8/2017)  2. Pt will improve score on the Lower Extremity Functional Scale to 50/80 in order to demonstrate improved functional mobility and quality of life. (MET 2/8/2017)  3. Pt will report standing for > 60 minutes with minimal to no increase in pain in order to be able to stand for prolonged periods as needed to perform household chores. (PROGRESSING 2/8/2017)  4. Pt will be able to ascend/descend 14 steps Flo with or without rail with reciprocal gait pattern in order to improve community mobility. (MET with rail 2/8/2017)  5. Pt will improve score on the Lower Extremity Functional Scale to 70/80 in order to demonstrate improved functional mobility and quality of life. (NEW GOAL 2/8/2017)  Rehabilitation Potential For Stated Goals: Good  Will discharge pt from physical therapy at this time secondary to reaching end of POC.   Thank you for this referral,  Yumiko Villafuerte DPT               EXAMINATION:   Observation/Orthostatic Postural Assessment:          Slight L pelvic drop, slight forward trunk in standing; kyphotic posture in sitting with forward head/rounded shoulders and posterior pelvic tilt - improved upright posture on 2/8/2017  Palpation: Tenderness noted in R PSIS region and R gluteal muscles  ROM:    Initial measurement: (on 1/6/2017) Initial measurement: (on 1/6/2017) Reassessment measurement:  Reassessment measurement:    L LE:  L hip motion WFL but painful in R groin/hip area with end range hip ER R LE:  R hip motion WFL but painful in R anterior hip with end range hip ER, SLR, adduction       Strength:    Initial measurement: (on 1/6/2017) Initial measurement: (on 1/6/2017) Reassessment measurement:  Reassessment measurement:    L LE:  Hip flexion: 5/5  Hip extension: 4+/5  Hip abduction: 5/5  Hip adduction: 4+/5  Hip IR: 5/5  Hip ER: 4+/5  Knee flexion: 4/5  Knee extension: 5/5  Ankle DF: 4+/5  Ankle PF: 5/5 R LE:  Hip flexion: 4-/5*  Hip extension: 4/5  Hip abduction: 4/5*  Hip adduction: 4+/5  Hip IR: 4+/5  Hip ER: 4/5*  Knee flexion: 4-/5  Knee extension: 4+/5 (pain in posterior R hip)  Ankle DF: 4+/5  Ankle PF: 5/5  *pain around R hip        Special Tests:          Scours: + on R for impingement        R anterior hip impingement: + on R        Functional leg length discrepancy: unclear, need to assess further next session        Sacral PA mobilizations: + for pain at R side of sacrum        Juan Francisco test stretch: no pain at R anterior hip on 2/8/2017  Neurological Screen:        Myotomes:  WFL        Dermatomes:  No deficits noted  Functional Mobility:         Gait/Ambulation:  Shortened L/R step length, decreased B step clearance, increased lateral sway        Transfers:  Pt had increased difficulty standing up with increased time taken  Balance:         Minimal to moderate lateral sway during ambulation  Sensation:         No deficits noted   Body Structures Involved:  1. Nerves  2. Bones  3. Joints  4. Muscles  5. Ligaments Body Functions Affected:  1. Sensory/Pain  2. Neuromusculoskeletal  3. Movement Related Activities and Participation Affected:  1. General Tasks and Demands  2. Mobility  3. Domestic Life  4.  Interpersonal Interactions and Relationships  5. Community, Social and [place]   # of elements that affect the Plan of Care: 4+: HIGH COMPLEXITY   CLINICAL DECISION MAKING:   Outcome Measure: Tool Used: Lower Extremity Functional Scale (LEFS)  Score:  Initial: 33/80 Most Recent: 65/80 (Date: 2/8/2017 )   Interpretation of Score: 20 questions each scored on a 5 point scale with 0 representing \"extreme difficulty or unable to perform\" and 4 representing \"no difficulty\". The lower the score, the greater the functional disability. 80/80 represents no disability. Minimal detectable change is 9 points. Score 80 79-63 62-48 47-32 31-16 15-1 0   Modifier CH CI CJ CK CL CM CN     ? Mobility - Walking and Moving Around:     - CURRENT STATUS: CI - 1%-19% impaired, limited or restricted    - GOAL STATUS: CI - 1%-19% impaired, limited or restricted    - D/C STATUS:  ---------------To be determined---------------    [place] / [place] / Product Type: Medicare /   Medical Necessity:   · Patient is expected to demonstrate progress in strength, range of motion, balance and functional technique to improve ability to perform pain-free standing/ambulation/kneeling and to improve overall quality of life. · Patient demonstrates good rehab potential due to higher previous functional level. Reason for Services/Other Comments:  · Patient continues to require modification of therapeutic interventions to increase complexity of exercises.    Use of outcome tool(s) and clinical judgement create a POC that gives a: Questionable prediction of patient's progress: [address], DPT

## 2017-11-13 ENCOUNTER — HOSPITAL ENCOUNTER (OUTPATIENT)
Dept: MAMMOGRAPHY | Age: 67
Discharge: HOME | End: 2017-11-13
Attending: FAMILY MEDICINE
Payer: MEDICARE

## 2017-11-13 DIAGNOSIS — Z12.31 VISIT FOR SCREENING MAMMOGRAM: ICD-10-CM

## 2017-11-13 PROCEDURE — 77067 SCR MAMMO BI INCL CAD: CPT

## 2017-11-27 ENCOUNTER — HOSPITAL ENCOUNTER (OUTPATIENT)
Dept: MRI IMAGING | Age: 67
Discharge: HOME | End: 2017-11-27
Payer: MEDICARE

## 2017-11-27 DIAGNOSIS — M25.551 RIGHT HIP PAIN: ICD-10-CM

## 2017-11-27 DIAGNOSIS — R10.30 GROIN PAIN: ICD-10-CM

## 2017-11-27 PROCEDURE — 72195 MRI PELVIS W/O DYE: CPT

## 2018-03-22 PROBLEM — M48.061 SPINAL STENOSIS OF LUMBAR REGION WITHOUT NEUROGENIC CLAUDICATION: Status: ACTIVE | Noted: 2018-03-22

## 2018-07-26 PROBLEM — E55.9 VITAMIN D DEFICIENCY: Status: ACTIVE | Noted: 2018-07-26

## 2018-12-08 ENCOUNTER — HOSPITAL ENCOUNTER (OUTPATIENT)
Dept: MAMMOGRAPHY | Age: 68
Discharge: HOME | End: 2018-12-08
Attending: FAMILY MEDICINE
Payer: MEDICARE

## 2018-12-08 DIAGNOSIS — Z12.39 SCREENING BREAST EXAMINATION: ICD-10-CM

## 2018-12-08 PROCEDURE — 77067 SCR MAMMO BI INCL CAD: CPT

## 2022-03-18 PROBLEM — E55.9 VITAMIN D DEFICIENCY: Status: ACTIVE | Noted: 2018-07-26

## 2022-03-19 PROBLEM — M48.061 SPINAL STENOSIS OF LUMBAR REGION WITHOUT NEUROGENIC CLAUDICATION: Status: ACTIVE | Noted: 2018-03-22

## 2022-03-19 PROBLEM — E03.2 HYPOTHYROIDISM DUE TO MEDICATION: Status: ACTIVE | Noted: 2017-03-01

## 2023-12-15 ENCOUNTER — OFFICE VISIT (OUTPATIENT)
Dept: ORTHOPEDIC SURGERY | Age: 73
End: 2023-12-15

## 2023-12-15 DIAGNOSIS — M19.079 ANKLE ARTHRITIS: ICD-10-CM

## 2023-12-15 DIAGNOSIS — M25.572 LEFT ANKLE PAIN, UNSPECIFIED CHRONICITY: Primary | ICD-10-CM

## 2023-12-15 NOTE — PROGRESS NOTES
Name: Hanna Nelson  YOB: 1950  Gender: female  MRN: [id number]    Summary:   Left posttraumatic ankle arthritis       CC: New Patient (Left ankle xrays obtained in office today. )       HPI: Hanna Nelson is a 68 y.o. female who presents with New Patient (Left ankle xrays obtained in office today. )  . This patient presents the office today with longstanding history of worsening left posttraumatic ankle arthritis and joint pain. She had an ORIF done back in 2006 for this. She has had progressively worsening pain and swelling particularly over the last several weeks and had a recent injury to the ankle. History was obtained by Patient     ROS/Meds/PSH/PMH/FH/SH: I personally reviewed the patients standard intake form. Below are the pertinents    Tobacco:  has no history on file for tobacco use. Diabetes: None      Physical Examination:    Exam of the left foot and ankle shows well-healed surgical incisions. There is mild tenderness over the hardware. She has pretty good tibiotalar joint range of motion but pain at the anteromedial joint line and crepitus. She has palpable pulses and intact sensation. Imaging:   Interpretation of imaging  Left ankle XR: AP, Lateral, Oblique views     ICD-10-CM    1. Left ankle pain, unspecified chronicity  M25.572 XR ANKLE LEFT (MIN 3 VIEWS)      2. Ankle arthritis  M19.079          Report: AP, lateral, oblique x-ray of the left ankle demonstrates ankle arthritis    Impression: Ankle arthritis   Vicki Ahuja III, MD           Assessment:   Left posttraumatic ankle arthritis    Treatment Plan:   4 This is a chronic illness/condition with exacerbation and progression  Treatment at this time: ASO - lace up Ankle and Minor Procedure: Injection done today: The patient understands the risks and complications associated with injection. After sterile prep of the area, the Left ankle joint was injected with 3 cc. of Xylocaine and 3 cc. of steroid. Franky Leos

## 2024-02-05 ENCOUNTER — HOSPITAL ENCOUNTER (OUTPATIENT)
Dept: GENERAL RADIOLOGY | Age: 74
Discharge: HOME | End: 2024-02-08
Payer: MEDICARE

## 2024-02-05 DIAGNOSIS — M25.552 BILATERAL HIP PAIN: ICD-10-CM

## 2024-02-05 DIAGNOSIS — M25.551 BILATERAL HIP PAIN: ICD-10-CM

## 2024-02-05 PROCEDURE — 73523 X-RAY EXAM HIPS BI 5/> VIEWS: CPT

## 2024-03-20 ENCOUNTER — OFFICE VISIT (OUTPATIENT)
Dept: ORTHOPEDIC SURGERY | Age: 74
End: 2024-03-20
Payer: MEDICARE

## 2024-03-20 DIAGNOSIS — M19.079 ANKLE ARTHRITIS: Primary | ICD-10-CM

## 2024-03-20 PROCEDURE — 4004F PT TOBACCO SCREEN RCVD TLK: CPT | Performed by: ORTHOPAEDIC SURGERY

## 2024-03-20 PROCEDURE — 1123F ACP DISCUSS/DSCN MKR DOCD: CPT | Performed by: ORTHOPAEDIC SURGERY

## 2024-03-20 PROCEDURE — 1090F PRES/ABSN URINE INCON ASSESS: CPT | Performed by: ORTHOPAEDIC SURGERY

## 2024-03-20 PROCEDURE — G8484 FLU IMMUNIZE NO ADMIN: HCPCS | Performed by: ORTHOPAEDIC SURGERY

## 2024-03-20 PROCEDURE — G8428 CUR MEDS NOT DOCUMENT: HCPCS | Performed by: ORTHOPAEDIC SURGERY

## 2024-03-20 PROCEDURE — G8421 BMI NOT CALCULATED: HCPCS | Performed by: ORTHOPAEDIC SURGERY

## 2024-03-20 PROCEDURE — 3017F COLORECTAL CA SCREEN DOC REV: CPT | Performed by: ORTHOPAEDIC SURGERY

## 2024-03-20 PROCEDURE — G8399 PT W/DXA RESULTS DOCUMENT: HCPCS | Performed by: ORTHOPAEDIC SURGERY

## 2024-03-20 PROCEDURE — 99213 OFFICE O/P EST LOW 20 MIN: CPT | Performed by: ORTHOPAEDIC SURGERY

## 2024-03-20 NOTE — PROGRESS NOTES
Name: Mariella Fuentes  YOB: 1950  Gender: female  MRN: [id number]    Summary:   Left ankle arthritis       CC: Follow-up (Left ankle)       HPI: Mariella Fuentes is a 74 y.o. female who presents with Follow-up (Left ankle)  .  This patient presents back the office today with improvements in her left ankle arthritis after injection and bracing.  He is also on some Celebrex from pain management doctor which has helped a lot.    History was obtained by Patient     ROS/Meds/PSH/PMH/FH/SH: I personally reviewed the patients standard intake form.  Below are the pertinents    Tobacco:  has no history on file for tobacco use.  Diabetes: None      Physical Examination:    Exam of the left lower extremity shows good range of motion of the ankle with very little crepitus.    Imaging:   No imaging reviewed           SUDHEER PATEL III, MD           Assessment:   Left ankle arthritis    Treatment Plan:   4 This is a chronic illness/condition with exacerbation and progression  Treatment at this time: Time with no intervention  Studies ordered: NO XR needed @ Next Visit    Weight-bearing status: WBAT        Return to work/work restrictions: none  No medications given    She got good response in the last injection and could certainly call back and have another injection whenever she desires.